# Patient Record
Sex: MALE | Race: WHITE | NOT HISPANIC OR LATINO | Employment: FULL TIME | URBAN - METROPOLITAN AREA
[De-identification: names, ages, dates, MRNs, and addresses within clinical notes are randomized per-mention and may not be internally consistent; named-entity substitution may affect disease eponyms.]

---

## 2017-01-06 ENCOUNTER — TRANSFERRED RECORDS (OUTPATIENT)
Dept: HEALTH INFORMATION MANAGEMENT | Facility: CLINIC | Age: 57
End: 2017-01-06

## 2017-01-06 LAB
ALT SERPL-CCNC: 16 IU/L (ref 10–40)
AST SERPL-CCNC: 19.2 IU/L (ref 10–42)

## 2017-06-27 ENCOUNTER — TRANSFERRED RECORDS (OUTPATIENT)
Dept: HEALTH INFORMATION MANAGEMENT | Facility: CLINIC | Age: 57
End: 2017-06-27

## 2017-06-27 LAB
ALT SERPL-CCNC: 15 IU/L (ref 10–40)
AST SERPL-CCNC: 18.2 IU/L (ref 10–42)

## 2017-12-14 ENCOUNTER — OFFICE VISIT (OUTPATIENT)
Dept: PEDIATRICS | Facility: CLINIC | Age: 57
End: 2017-12-14
Payer: COMMERCIAL

## 2017-12-14 VITALS
SYSTOLIC BLOOD PRESSURE: 120 MMHG | HEART RATE: 66 BPM | DIASTOLIC BLOOD PRESSURE: 82 MMHG | BODY MASS INDEX: 25.47 KG/M2 | OXYGEN SATURATION: 99 % | HEIGHT: 72 IN | WEIGHT: 188 LBS | TEMPERATURE: 98.2 F

## 2017-12-14 DIAGNOSIS — Z11.59 NEED FOR HEPATITIS C SCREENING TEST: ICD-10-CM

## 2017-12-14 DIAGNOSIS — Z00.00 ENCOUNTER FOR ROUTINE ADULT HEALTH EXAMINATION WITHOUT ABNORMAL FINDINGS: Primary | ICD-10-CM

## 2017-12-14 DIAGNOSIS — Z23 NEED FOR PROPHYLACTIC VACCINATION AND INOCULATION AGAINST INFLUENZA: ICD-10-CM

## 2017-12-14 LAB
ALBUMIN SERPL-MCNC: 3.7 G/DL (ref 3.4–5)
ALP SERPL-CCNC: 46 U/L (ref 40–150)
ALT SERPL W P-5'-P-CCNC: 21 U/L (ref 0–70)
ANION GAP SERPL CALCULATED.3IONS-SCNC: 6 MMOL/L (ref 3–14)
AST SERPL W P-5'-P-CCNC: 16 U/L (ref 0–45)
BILIRUB SERPL-MCNC: 0.4 MG/DL (ref 0.2–1.3)
BUN SERPL-MCNC: 17 MG/DL (ref 7–30)
CALCIUM SERPL-MCNC: 8.8 MG/DL (ref 8.5–10.1)
CHLORIDE SERPL-SCNC: 108 MMOL/L (ref 94–109)
CHOLEST SERPL-MCNC: 176 MG/DL
CO2 SERPL-SCNC: 28 MMOL/L (ref 20–32)
CREAT SERPL-MCNC: 1.04 MG/DL (ref 0.66–1.25)
GFR SERPL CREATININE-BSD FRML MDRD: 73 ML/MIN/1.7M2
GLUCOSE SERPL-MCNC: 94 MG/DL (ref 70–99)
HCV AB SERPL QL IA: NONREACTIVE
HDLC SERPL-MCNC: 50 MG/DL
LDLC SERPL CALC-MCNC: 98 MG/DL
NONHDLC SERPL-MCNC: 126 MG/DL
POTASSIUM SERPL-SCNC: 4.3 MMOL/L (ref 3.4–5.3)
PROT SERPL-MCNC: 6.7 G/DL (ref 6.8–8.8)
SODIUM SERPL-SCNC: 142 MMOL/L (ref 133–144)
TRIGL SERPL-MCNC: 141 MG/DL
TSH SERPL DL<=0.005 MIU/L-ACNC: 3.43 MU/L (ref 0.4–4)

## 2017-12-14 PROCEDURE — 84443 ASSAY THYROID STIM HORMONE: CPT | Performed by: INTERNAL MEDICINE

## 2017-12-14 PROCEDURE — 99396 PREV VISIT EST AGE 40-64: CPT | Performed by: INTERNAL MEDICINE

## 2017-12-14 PROCEDURE — 80061 LIPID PANEL: CPT | Performed by: INTERNAL MEDICINE

## 2017-12-14 PROCEDURE — 36415 COLL VENOUS BLD VENIPUNCTURE: CPT | Performed by: INTERNAL MEDICINE

## 2017-12-14 PROCEDURE — 86803 HEPATITIS C AB TEST: CPT | Performed by: INTERNAL MEDICINE

## 2017-12-14 PROCEDURE — 80053 COMPREHEN METABOLIC PANEL: CPT | Performed by: INTERNAL MEDICINE

## 2017-12-14 RX ORDER — CARNOSINE 100 %
POWDER (GRAM) MISCELLANEOUS
COMMUNITY

## 2017-12-14 NOTE — NURSING NOTE
Chief Complaint   Patient presents with     Physical       Initial /82 (BP Location: Right arm, Cuff Size: Adult Regular)  Pulse 66  Temp 98.2  F (36.8  C) (Oral)  Ht 6' (1.829 m)  Wt 188 lb (85.3 kg)  SpO2 99%  BMI 25.5 kg/m2 Estimated body mass index is 25.5 kg/(m^2) as calculated from the following:    Height as of this encounter: 6' (1.829 m).    Weight as of this encounter: 188 lb (85.3 kg).  Medication Reconciliation: complete     Becki Connell MA   December 14, 2017,  10:24 AM

## 2017-12-14 NOTE — MR AVS SNAPSHOT
"              After Visit Summary   12/14/2017    Ladarius Gaytan    MRN: [id number]           Patient Information     Date Of Birth          1960        Visit Information        Provider Department      12/14/2017 10:20 AM Stevie Hood MD [facility]        Today's Diagnoses     Encounter for routine adult health examination without abnormal findings    -  1    Need for hepatitis C screening test        Need for prophylactic vaccination and inoculation against influenza          Care Instructions        Lab work downstairs today.  Directions:  As you walk through the first floor, you'll see (on the right) first the pharmacy, then some bathrooms, then the \"Lab and Imaging\" area. Give them your name at the window there and wait for them to call you.     Consider shingles vaccine at age 60, and pneumonia shots at age 65 and 66.    Preventive Health Recommendations  Male Ages 50 - 64    Yearly exam:             See your health care provider every year in order to  o   Review health changes.   o   Discuss preventive care.    o   Review your medicines if your doctor has prescribed any.     Have a cholesterol test every 5 years, or more frequently if you are at risk for high cholesterol/heart disease.     Have a diabetes test (fasting glucose) every three years. If you are at risk for diabetes, you should have this test more often.     Have a colonoscopy at age 50, or have a yearly FIT test (stool test). These exams will check for colon cancer.      Talk with your health care provider about whether or not a prostate cancer screening test (PSA) is right for you.    You should be tested each year for STDs (sexually transmitted diseases), if you re at risk.     Shots: Get a flu shot each year. Get a tetanus shot every 10 years.     Nutrition:    Eat at least 5 servings of fruits and vegetables daily.     Eat whole-grain bread, whole-wheat pasta and brown rice instead of white grains and rice.     Talk to " your provider about Calcium and Vitamin D.     Lifestyle    Exercise for at least 150 minutes a week (30 minutes a day, 5 days a week). This will help you control your weight and prevent disease.     Limit alcohol to one drink per day.     No smoking.     Wear sunscreen to prevent skin cancer.     See your dentist every six months for an exam and cleaning.     See your eye doctor every 1 to 2 years.                                       Rotator Cuff Injury   What is a rotator cuff injury?   A rotator cuff injury is a strain or tear in the group of tendons and muscles that hold your shoulder joint together and help move your shoulder.   How does it occur?   A rotator cuff injury may result from:     using your arm to break a fall     falling onto your arm     lifting a heavy object     use of your shoulder in sports with a repetitive overhead movement, such as swimming, baseball (mainly pitchers), football, and tennis, which gradually strains the tendon     manual labor such as painting, plastering, raking leaves, or housework   What are the symptoms?   The symptoms of a torn rotator cuff are:     arm and shoulder pain     shoulder weakness     shoulder tenderness     loss of shoulder movement, especially overhead   How is it diagnosed?   Your healthcare provider will examine you and check your shoulder for pain, tenderness, and loss of motion as you move your arm in all directions. Your provider will ask if your shoulder pain began suddenly or gradually. You may have an X-ray to make sure there are not any fractures or bone spurs.   Based on these results, you may have other tests or procedures right away or later, such as:     magnetic resonance imaging (MRI), which creates images of your shoulder and surrounding structures with sound waves     an arthrogram, which is an X-ray or MRI that is taken after a special dye has been injected into your shoulder joint to outline its soft structures     arthroscopy, a  surgical procedure in which a small instrument is inserted into your shoulder joint so your provider can look directly at your rotator cuff   What is the treatment?   A tendon in your shoulder can be inflamed, partially torn, or completely torn. What is done about it depends on how torn it is and how much it hurts.   If your tear is a minor one, it can be left to heal by itself if it does not interfere with your everyday activities. Your treatment plan should include:     proper sitting posture, in which your head and shoulders are balanced     rest for your shoulder, which means avoiding strenuous activity or any overhead motion that causes pain     ice packs at least once a day, and preferably 2 or 3 times a day     doing the exercises your healthcare provider gives you     anti-inflammatory drugs. Adults aged 65 years and older should not take non-steroidal anti-inflammatory medicine for more than 7 days without their healthcare provider's approval.     physical therapy to strengthen your shoulder as it heals   If you have a bad tear, you may need to have it repaired by arthroscopy. Arthroscopy can be used to perform surgery on a joint as well as to see inside the joint. The rough edges of a torn tendon can be trimmed and left to heal. Larger tears can be stitched back together. After surgery, your treatment plan will include physical therapy to strengthen your shoulder as it heals.   How long will the effects last?   Full recovery depends on what is torn and how it is treated.   When can I return to my normal activities?   Everyone recovers from an injury at a different rate. Return to your activities will be determined by how soon your shoulder recovers, not by how many days or weeks it has been since your injury has occurred. In general, the longer you have symptoms before you start treatment, the longer it will take to get better. The goal of rehabilitation is to return you to your normal activities as soon  as is safely possible. If you return too soon you may worsen your injury.   You may safely return to your normal activities when:     Your injured shoulder has full range of motion without pain.     Your injured shoulder has regained normal strength compared to the uninjured shoulder.   What can be done to help prevent this from recurring?   The best way to prevent a recurrence is to strengthen your shoulder muscles and keep them in peak condition with shoulder exercises.           Rotator Cuff Strain Rehabilitation Exercises                  You may do all of these exercises right away.   Isometric shoulder external rotation:  a doorway with your elbow bent 90 degrees and the back of the wrist on your injured side pressed against the door frame. Try to press your hand outward into the door frame. Hold for 5 seconds. Do 3 sets of 10.   Isometric shoulder internal rotation:  a doorway with your elbow bent 90 degrees and the front of the wrist on your injured side pressed against the door frame. Try to press your palm into the door frame. Hold for 5 seconds. Do 3 sets of 10.   Wand exercise: Flexion: Stand upright and hold a stick in both hands, palms down. Stretch your arms by lifting them over your head, keeping your arms straight. Hold for 5 seconds and return to the starting position. Repeat 10 times.   Wand exercise: Extension: Stand upright and hold a stick in both hands behind your back. Move the stick away from your back. Hold the end position for 5 seconds. Relax and return to the starting position. Repeat 10 times.   Wand exercise: External rotation: Lie on your back and hold a stick in both hands, palms up. Your upper arms should be resting on the floor with your elbows at your sides and bent 90 degrees. Use your uninjured arm to push your injured arm out away from your body. Keep the elbow of your injured arm at your side while it is being pushed. Hold the stretch for 5 seconds. Repeat 10  times.   Wand exercise: Shoulder abduction and adduction: Stand and hold a stick with both hands, palms facing away from your body. Rest the stick against the front of your thighs. Use your uninjured arm to push your injured arm out to the side and up as high as possible. Keep your arms straight. Hold for 5 seconds. Repeat 10 times.   Resisted shoulder external rotation: Stand sideways next to a door with your injured arm farther from the door. Tie a knot in the end of the tubing and shut the knot in the door at waist level. Hold the other end of the tubing with the hand of your injured arm. Rest the hand of your injured arm across your stomach. Keeping your elbow in at your side, rotate your arm outward and away from your waist. Make sure you keep your elbow bent 90 degrees and your forearm parallel to the floor. Repeat 10 times. Build up to 3 sets of 10.   Resisted shoulder internal rotation: Stand sideways next to a door with your injured arm closest to the door. Tie a knot in the end of the tubing and shut the knot in the door at waist level. Hold the other end of the tubing with the hand of your injured arm. Bend the elbow of your injured arm 90 degrees. Keeping your elbow in at your side, rotate your forearm across your body and then back to the starting position. Make sure you keep your forearm parallel to the floor. Do 3 sets of 10.   Scaption: Stand with your arms at your sides and with your elbows straight. Slowly raise your arms to eye level. As you raise your arms, spread them apart so that they are only slightly in front of your body (at about a 30-degree angle to the front of your body). Point your thumbs toward the ceiling. Hold for 2 seconds and lower your arms slowly. Do 3 sets of 10. Progress to holding a soup can or light weight when you are doing the exercise and increase the weight as the exercise gets easier.   Side-lying external rotation: Lie on your uninjured side with your injured arm at  "your side and your elbow bent 90 degrees. Keeping your elbow against your side, raise your forearm toward the ceiling and hold for 2 seconds. Slowly lower your arm. Do 3 sets of 10. You can start doing this exercise holding a soup can or light weight and gradually increase the weight as long as there is no pain.   Horizontal abduction: Lie on your stomach on a table or the edge of a bed with the arm on your injured side hanging down over the edge. Raise your arm out to the side, with your thumb pointed toward the ceiling, until your arm is parallel to the floor. Hold for 2 seconds and then lower it slowly. Start this exercise with no weight. As you get stronger, add a light weight or hold a soup can. Do 3 sets of 10.   Push-up with a plus: Begin on the floor on your hands and knees. Keep your arms a shoulder width apart and lift your feet off the floor. Arch your back as high as possible and round your shoulders (this is the \"plus\" part or the exercise). Bend your elbows and lower your body to the floor. Return to the starting position and arch your back again. Do 3 sets of 10.   Published by [employer].  This content is reviewed periodically and is subject to change as new health information becomes available. The information is intended to inform and educate and is not a replacement for medical evaluation, advice, diagnosis or treatment by a healthcare professional.   Written by Indira Bar, MS, PT, and Marnie Quan PT, [facility], [facility], for [employer].   ? 2010 [facility] and/or its affiliates. All Rights Reserved.   Copyright   [employer] 2011  Adult Health Advisor                          Follow-ups after your visit        Who to contact     If you have questions or need follow up information about today's clinic visit or your schedule please contact [facility] directly at [phone].  Normal or non-critical lab and imaging results will be communicated to you by MyChart, letter or " phone within 4 business days after the clinic has received the results. If you do not hear from us within 7 days, please contact the clinic through [employer] or phone. If you have a critical or abnormal lab result, we will notify you by phone as soon as possible.  Submit refill requests through [employer] or call your pharmacy and they will forward the refill request to us. Please allow 3 business days for your refill to be completed.          Additional Information About Your Visit        [employer] Information     [employer] gives you secure access to your electronic health record. If you see a primary care provider, you can also send messages to your care team and make appointments. If you have questions, please call your primary care clinic.  If you do not have a primary care provider, please call [phone] and they will assist you.        Care EveryWhere ID     This is your Care EveryWhere ID. This could be used by other organizations to access your [place] medical records  [id number]        Your Vitals Were     Pulse Temperature Height Pulse Oximetry BMI (Body Mass Index)       66 98.2  F (36.8  C) (Oral) 6' (1.829 m) 99% 25.5 kg/m2        Blood Pressure from Last 3 Encounters:   12/14/17 120/82   01/29/16 107/66   06/11/15 110/68    Weight from Last 3 Encounters:   12/14/17 188 lb (85.3 kg)   01/29/16 196 lb (88.9 kg)   06/11/15 191 lb (86.6 kg)              We Performed the Following     Comprehensive metabolic panel     Hepatitis C Screen Reflex to HCV RNA Quant and Genotype     Lipid panel reflex to direct LDL Fasting     TSH with free T4 reflex        Primary Care Provider Office Phone # Fax #    Stevie Hood [phone] [address] [place] [address]        Equal Access to Services     [facility] : Nano Davey, rika tucker, diann jaimes. So [facility] [phone].    ATENCIÓN: Greta fernandez,  tiene a braswell disposición servicios gratuitos de asistencia lingüística. Yohan loza [phone].    We comply with applicable federal civil rights laws and [place] laws. We do not discriminate on the basis of race, color, national origin, age, disability, sex, sexual orientation, or gender identity.            Thank you!     Thank you for choosing [facility]  for your care. Our goal is always to provide you with excellent care. Hearing back from our patients is one way we can continue to improve our services. Please take a few minutes to complete the written survey that you may receive in the mail after your visit with us. Thank you!             Your Updated Medication List - Protect others around you: Learn how to safely use, store and throw away your medicines at www.disposemymeds.org.          This list is accurate as of: 12/14/17 10:48 AM.  Always use your most recent med list.                   Brand Name Dispense Instructions for use Diagnosis    CENTRUM Tabs     30 tablet    Take 1 tablet by mouth daily        clomiPHENE 50 MG tablet    CLOMID     Take 25 mg by mouth daily        L-Carnosine Powd           ZINC PO

## 2017-12-14 NOTE — PATIENT INSTRUCTIONS
"    Lab work downstairs today.  Directions:  As you walk through the first floor, you'll see (on the right) first the pharmacy, then some bathrooms, then the \"Lab and Imaging\" area. Give them your name at the window there and wait for them to call you.     Consider shingles vaccine at age 60, and pneumonia shots at age 65 and 66.    Preventive Health Recommendations  Male Ages 50 - 64    Yearly exam:             See your health care provider every year in order to  o   Review health changes.   o   Discuss preventive care.    o   Review your medicines if your doctor has prescribed any.     Have a cholesterol test every 5 years, or more frequently if you are at risk for high cholesterol/heart disease.     Have a diabetes test (fasting glucose) every three years. If you are at risk for diabetes, you should have this test more often.     Have a colonoscopy at age 50, or have a yearly FIT test (stool test). These exams will check for colon cancer.      Talk with your health care provider about whether or not a prostate cancer screening test (PSA) is right for you.    You should be tested each year for STDs (sexually transmitted diseases), if you re at risk.     Shots: Get a flu shot each year. Get a tetanus shot every 10 years.     Nutrition:    Eat at least 5 servings of fruits and vegetables daily.     Eat whole-grain bread, whole-wheat pasta and brown rice instead of white grains and rice.     Talk to your provider about Calcium and Vitamin D.     Lifestyle    Exercise for at least 150 minutes a week (30 minutes a day, 5 days a week). This will help you control your weight and prevent disease.     Limit alcohol to one drink per day.     No smoking.     Wear sunscreen to prevent skin cancer.     See your dentist every six months for an exam and cleaning.     See your eye doctor every 1 to 2 years.                                       Rotator Cuff Injury   What is a rotator cuff injury?   A rotator cuff injury is a " strain or tear in the group of tendons and muscles that hold your shoulder joint together and help move your shoulder.   How does it occur?   A rotator cuff injury may result from:     using your arm to break a fall     falling onto your arm     lifting a heavy object     use of your shoulder in sports with a repetitive overhead movement, such as swimming, baseball (mainly pitchers), football, and tennis, which gradually strains the tendon     manual labor such as painting, plastering, raking leaves, or housework   What are the symptoms?   The symptoms of a torn rotator cuff are:     arm and shoulder pain     shoulder weakness     shoulder tenderness     loss of shoulder movement, especially overhead   How is it diagnosed?   Your healthcare provider will examine you and check your shoulder for pain, tenderness, and loss of motion as you move your arm in all directions. Your provider will ask if your shoulder pain began suddenly or gradually. You may have an X-ray to make sure there are not any fractures or bone spurs.   Based on these results, you may have other tests or procedures right away or later, such as:     magnetic resonance imaging (MRI), which creates images of your shoulder and surrounding structures with sound waves     an arthrogram, which is an X-ray or MRI that is taken after a special dye has been injected into your shoulder joint to outline its soft structures     arthroscopy, a surgical procedure in which a small instrument is inserted into your shoulder joint so your provider can look directly at your rotator cuff   What is the treatment?   A tendon in your shoulder can be inflamed, partially torn, or completely torn. What is done about it depends on how torn it is and how much it hurts.   If your tear is a minor one, it can be left to heal by itself if it does not interfere with your everyday activities. Your treatment plan should include:     proper sitting posture, in which your head and  shoulders are balanced     rest for your shoulder, which means avoiding strenuous activity or any overhead motion that causes pain     ice packs at least once a day, and preferably 2 or 3 times a day     doing the exercises your healthcare provider gives you     anti-inflammatory drugs. Adults aged 65 years and older should not take non-steroidal anti-inflammatory medicine for more than 7 days without their healthcare provider's approval.     physical therapy to strengthen your shoulder as it heals   If you have a bad tear, you may need to have it repaired by arthroscopy. Arthroscopy can be used to perform surgery on a joint as well as to see inside the joint. The rough edges of a torn tendon can be trimmed and left to heal. Larger tears can be stitched back together. After surgery, your treatment plan will include physical therapy to strengthen your shoulder as it heals.   How long will the effects last?   Full recovery depends on what is torn and how it is treated.   When can I return to my normal activities?   Everyone recovers from an injury at a different rate. Return to your activities will be determined by how soon your shoulder recovers, not by how many days or weeks it has been since your injury has occurred. In general, the longer you have symptoms before you start treatment, the longer it will take to get better. The goal of rehabilitation is to return you to your normal activities as soon as is safely possible. If you return too soon you may worsen your injury.   You may safely return to your normal activities when:     Your injured shoulder has full range of motion without pain.     Your injured shoulder has regained normal strength compared to the uninjured shoulder.   What can be done to help prevent this from recurring?   The best way to prevent a recurrence is to strengthen your shoulder muscles and keep them in peak condition with shoulder exercises.           Rotator Cuff Strain Rehabilitation  Exercises                  You may do all of these exercises right away.   Isometric shoulder external rotation:  a doorway with your elbow bent 90 degrees and the back of the wrist on your injured side pressed against the door frame. Try to press your hand outward into the door frame. Hold for 5 seconds. Do 3 sets of 10.   Isometric shoulder internal rotation:  a doorway with your elbow bent 90 degrees and the front of the wrist on your injured side pressed against the door frame. Try to press your palm into the door frame. Hold for 5 seconds. Do 3 sets of 10.   Wand exercise: Flexion: Stand upright and hold a stick in both hands, palms down. Stretch your arms by lifting them over your head, keeping your arms straight. Hold for 5 seconds and return to the starting position. Repeat 10 times.   Wand exercise: Extension: Stand upright and hold a stick in both hands behind your back. Move the stick away from your back. Hold the end position for 5 seconds. Relax and return to the starting position. Repeat 10 times.   Wand exercise: External rotation: Lie on your back and hold a stick in both hands, palms up. Your upper arms should be resting on the floor with your elbows at your sides and bent 90 degrees. Use your uninjured arm to push your injured arm out away from your body. Keep the elbow of your injured arm at your side while it is being pushed. Hold the stretch for 5 seconds. Repeat 10 times.   Wand exercise: Shoulder abduction and adduction: Stand and hold a stick with both hands, palms facing away from your body. Rest the stick against the front of your thighs. Use your uninjured arm to push your injured arm out to the side and up as high as possible. Keep your arms straight. Hold for 5 seconds. Repeat 10 times.   Resisted shoulder external rotation: Stand sideways next to a door with your injured arm farther from the door. Tie a knot in the end of the tubing and shut the knot in the door at waist  level. Hold the other end of the tubing with the hand of your injured arm. Rest the hand of your injured arm across your stomach. Keeping your elbow in at your side, rotate your arm outward and away from your waist. Make sure you keep your elbow bent 90 degrees and your forearm parallel to the floor. Repeat 10 times. Build up to 3 sets of 10.   Resisted shoulder internal rotation: Stand sideways next to a door with your injured arm closest to the door. Tie a knot in the end of the tubing and shut the knot in the door at waist level. Hold the other end of the tubing with the hand of your injured arm. Bend the elbow of your injured arm 90 degrees. Keeping your elbow in at your side, rotate your forearm across your body and then back to the starting position. Make sure you keep your forearm parallel to the floor. Do 3 sets of 10.   Scaption: Stand with your arms at your sides and with your elbows straight. Slowly raise your arms to eye level. As you raise your arms, spread them apart so that they are only slightly in front of your body (at about a 30-degree angle to the front of your body). Point your thumbs toward the ceiling. Hold for 2 seconds and lower your arms slowly. Do 3 sets of 10. Progress to holding a soup can or light weight when you are doing the exercise and increase the weight as the exercise gets easier.   Side-lying external rotation: Lie on your uninjured side with your injured arm at your side and your elbow bent 90 degrees. Keeping your elbow against your side, raise your forearm toward the ceiling and hold for 2 seconds. Slowly lower your arm. Do 3 sets of 10. You can start doing this exercise holding a soup can or light weight and gradually increase the weight as long as there is no pain.   Horizontal abduction: Lie on your stomach on a table or the edge of a bed with the arm on your injured side hanging down over the edge. Raise your arm out to the side, with your thumb pointed toward the  "ceiling, until your arm is parallel to the floor. Hold for 2 seconds and then lower it slowly. Start this exercise with no weight. As you get stronger, add a light weight or hold a soup can. Do 3 sets of 10.   Push-up with a plus: Begin on the floor on your hands and knees. Keep your arms a shoulder width apart and lift your feet off the floor. Arch your back as high as possible and round your shoulders (this is the \"plus\" part or the exercise). Bend your elbows and lower your body to the floor. Return to the starting position and arch your back again. Do 3 sets of 10.   Published by [employer].  This content is reviewed periodically and is subject to change as new health information becomes available. The information is intended to inform and educate and is not a replacement for medical evaluation, advice, diagnosis or treatment by a healthcare professional.   Written by Indira Bar, MS, PT, and Marnie Quan PT, [facility], [facility], for [employer].   ? 2010 [facility] and/or its affiliates. All Rights Reserved.   Copyright   [employer] 2011  Adult Health Advisor                  "

## 2017-12-14 NOTE — PROGRESS NOTES
"SUBJECTIVE:   CC: Ladarius Gaytan is an 57 year old male who presents for preventative health visit.     Physical   Annual:     Getting at least 3 servings of Calcium per day::  Yes    Bi-annual eye exam::  Yes    Dental care twice a year::  NO    Sleep apnea or symptoms of sleep apnea::  None    Diet::  Regular (no restrictions)    Frequency of exercise::  4-5 days/week    Duration of exercise::  30-45 minutes    Taking medications regularly::  Yes    Medication side effects::  Other    Additional concerns today::  YES              Pt has a few topics to discuss today.     Seeing urology for his clomid and testosterone.     Worried about his vitelligo spreading.    Depression.  Mild symptoms.  Wondering about \"low dose naltrexone.\"    SOme lesions on his shoulders.        Today's PHQ-2 Score:   PHQ-2 ( 1999 [employer]) 12/14/2017   Q1: Little interest or pleasure in doing things 1   Q2: Feeling down, depressed or hopeless 1   PHQ-2 Score 2   Q1: Little interest or pleasure in doing things Several days   Q2: Feeling down, depressed or hopeless Several days   PHQ-2 Score 2       Abuse: Current or Past (Physical, Sexual or Emotional)- No  Do you feel safe in your environment - Yes    Social History   Substance Use Topics     Smoking status: Former Smoker     Packs/day: 1.00     Years: 25.00     Types: Cigarettes     Quit date: 10/9/2010     Smokeless tobacco: Never Used     Alcohol use Yes      Comment: 3-6 beers, three times weekly.      Alcohol Use 12/14/2017   If you drink alcohol, do you typically have greater than 3 drinks per day OR greater than 7 drinks per week?   Yes   AUDIT SCORE  5     AUDIT - Alcohol Use Disorders Identification Test - Reproduced from the World Health Organization Audit 2001 (Second Edition) 12/14/2017   1.  How often do you have a drink containing alcohol? 2 to 3 times a week   2.  How many drinks containing alcohol do you have on a typical day when you are drinking? 3 or 4   3.  How often do " you have five or more drinks on one occasion? Less than monthly   4.  How often during the last year have you found that you were not able to stop drinking once you had started? Never   5.  How often during the last year have you failed to do what was normally expected of you because of drinking? Never   6.  How often during the last year have you needed a first drink in the morning to get yourself going after a heavy drinking session? Never   7.  How often during the last year have you had a feeling of guilt or remorse after drinking? Never   8.  How often during the last year have you been unable to remember what happened the night before because of your drinking? Never   9.  Have you or someone else been injured because of your drinking? No   10. Has a relative, friend, doctor or other health care worker been concerned about your drinking or suggested you cut down? No   TOTAL SCORE 5         Last PSA: No results found for: PSA    Reviewed orders with patient. Reviewed health maintenance and updated orders accordingly - Yes  Labs reviewed in EPIC  BP Readings from Last 3 Encounters:   12/14/17 120/82   01/29/16 107/66   06/11/15 110/68    Wt Readings from Last 3 Encounters:   12/14/17 188 lb (85.3 kg)   01/29/16 196 lb (88.9 kg)   06/11/15 191 lb (86.6 kg)                  Patient Active Problem List   Diagnosis     Lumbar radiculopathy     Renal cyst     Vitamin D deficiency     Alcohol abuse     Impaired fasting glucose     Erectile dysfunction     Past Surgical History:   Procedure Laterality Date     COLONOSCOPY  11/18/2011    Procedure:COLONOSCOPY; COLONOSCOPY; Surgeon:JESE FRANKLIN; Location: GI     DISCECTOMY LUMBAR MINIMALLY INVASIVE ONE LEVEL  2011    right L4-5 microdiscectomy     EXCISE MASS LOWER EXTREMITY  2/12/2014    Procedure: EXCISE MASS LOWER EXTREMITY;  Right Knee/tibial tubercle Mass Excision ;  Surgeon: Clifford Aguilera MD;  Location:  OR       Social History   Substance Use Topics      Smoking status: Former Smoker     Packs/day: 1.00     Years: 25.00     Types: Cigarettes     Quit date: 10/9/2010     Smokeless tobacco: Never Used     Alcohol use Yes      Comment: 3-6 beers, three times weekly.      Family History   Problem Relation Age of Onset     Unknown/Adopted Mother      Unknown/Adopted Father          Current Outpatient Prescriptions   Medication Sig Dispense Refill     Multiple Vitamins-Minerals (ZINC PO)        L-Carnosine POWD        clomiPHENE (CLOMID) 50 MG tablet Take 25 mg by mouth daily       Multiple Vitamins-Minerals (CENTRUM) TABS Take 1 tablet by mouth daily 30 tablet      Allergies   Allergen Reactions     Ibuprofen [Aspartame-Ibuprofen] Hives and Swelling     Penicillins Hives and Swelling     No problems with Cephalexin     Aspirin      Eyes swelled     Gadolinium Hives           Reviewed and updated as needed this visit by clinical staff  Tobacco  Allergies  Meds  Problems  Med Hx  Surg Hx  Fam Hx  Soc Hx          Reviewed and updated as needed this visit by Provider  Allergies  Meds  Problems          History reviewed. No pertinent past medical history.   Past Surgical History:   Procedure Laterality Date     COLONOSCOPY  11/18/2011    Procedure:COLONOSCOPY; COLONOSCOPY; Surgeon:JESE FRANKLIN; Location: GI     DISCECTOMY LUMBAR MINIMALLY INVASIVE ONE LEVEL  2011    right L4-5 microdiscectomy     EXCISE MASS LOWER EXTREMITY  2/12/2014    Procedure: EXCISE MASS LOWER EXTREMITY;  Right Knee/tibial tubercle Mass Excision ;  Surgeon: Clifford Aguilera MD;  Location:  OR     Review of Systems  C: NEGATIVE for fever, chills, change in weight  I: NEGATIVE for worrisome rashes, moles or lesions  E: NEGATIVE for vision changes or irritation  ENT: NEGATIVE for ear, mouth and throat problems  R: NEGATIVE for significant cough or SOB  CV: NEGATIVE for chest pain, palpitations or peripheral edema  GI: NEGATIVE for nausea, abdominal pain, heartburn, or change in  bowel habits   male: negative for dysuria, hematuria, decreased urinary stream, erectile dysfunction, urethral discharge  M: NEGATIVE for significant arthralgias or myalgia  N: NEGATIVE for weakness, dizziness or paresthesias  P: NEGATIVE for changes in mood or affect    OBJECTIVE:   /82 (BP Location: Right arm, Cuff Size: Adult Regular)  Pulse 66  Temp 98.2  F (36.8  C) (Oral)  Ht 6' (1.829 m)  Wt 188 lb (85.3 kg)  SpO2 99%  BMI 25.5 kg/m2    Physical Exam  GENERAL: healthy, alert and no distress  EYES: Eyes grossly normal to inspection, PERRL and conjunctivae and sclerae normal  HENT: ear canals and TM's normal, nose and mouth without ulcers or lesions  NECK: no adenopathy, no asymmetry, masses, or scars and thyroid normal to palpation  RESP: lungs clear to auscultation - no rales, rhonchi or wheezes  CV: regular rate and rhythm, normal S1 S2, no S3 or S4, no murmur, click or rub, no peripheral edema and peripheral pulses strong  ABDOMEN: soft, nontender, no hepatosplenomegaly, no masses and bowel sounds normal   (male): normal male genitalia without lesions or urethral discharge, no hernia  MS: no gross musculoskeletal defects noted, no edema  SKIN: no suspicious lesions or rashes  NEURO: Normal strength and tone, mentation intact and speech normal  PSYCH: mentation appears normal, affect normal/bright    ASSESSMENT/PLAN:   1. Need for hepatitis C screening test      2. Need for prophylactic vaccination and inoculation against influenza      3. Encounter for routine adult health examination without abnormal findings  Discussed diet, exercise, testicular self exam, blood pressure, cholesterol, and need for cancer surveillance at appropriate ages.   - Hepatitis C Screen Reflex to HCV RNA Quant and Genotype  - TSH with free T4 reflex  - Comprehensive metabolic panel  - Lipid panel reflex to direct LDL Fasting    4.  Shoulder pain: advised beginning exercises.    5.  Concerns about vitiligo;  Patient  "wanting prescription for \"low dose naltrexone\", which I refused.  Not sure what we would be treating.  He may discuss with rheumatologist, as he was refereed to them from his urology for vague concerns of inlammation, depression, thyroid concerns.  Screen with thyroid stimulating hormone (TSH) today .    COUNSELING:   Reviewed preventive health counseling, as reflected in patient instructions       Regular exercise       Healthy diet/nutrition       Vision screening       Hearing screening       Family planning       Safe sex practices/STD prevention       Colon cancer screening       Prostate cancer screening           reports that he quit smoking about 7 years ago. His smoking use included Cigarettes. He has a 25.00 pack-year smoking history. He has never used smokeless tobacco.      Estimated body mass index is 25.5 kg/(m^2) as calculated from the following:    Height as of this encounter: 6' (1.829 m).    Weight as of this encounter: 188 lb (85.3 kg).         Counseling Resources:  ATP IV Guidelines  Pooled Cohorts Equation Calculator  FRAX Risk Assessment  ICSI Preventive Guidelines  Dietary Guidelines for Americans, 2010  USDA's MyPlate  ASA Prophylaxis  Lung CA Screening    Stevie Hood MD  [facility]  "

## 2018-02-15 ENCOUNTER — TRANSFERRED RECORDS (OUTPATIENT)
Dept: HEALTH INFORMATION MANAGEMENT | Facility: CLINIC | Age: 58
End: 2018-02-15

## 2018-02-15 LAB
ALT SERPL-CCNC: 13 IU/L (ref 10–40)
AST SERPL-CCNC: 15.3 IU/L (ref 10–42)

## 2018-07-25 ENCOUNTER — TRANSFERRED RECORDS (OUTPATIENT)
Dept: HEALTH INFORMATION MANAGEMENT | Facility: CLINIC | Age: 58
End: 2018-07-25

## 2018-07-25 LAB
ALT SERPL-CCNC: 15 IU/L (ref 10–40)
AST SERPL-CCNC: 16.3 IU/L (ref 10–42)

## 2018-08-01 ENCOUNTER — TRANSFERRED RECORDS (OUTPATIENT)
Dept: HEALTH INFORMATION MANAGEMENT | Facility: CLINIC | Age: 58
End: 2018-08-01

## 2019-02-13 ENCOUNTER — TRANSFERRED RECORDS (OUTPATIENT)
Dept: HEALTH INFORMATION MANAGEMENT | Facility: CLINIC | Age: 59
End: 2019-02-13

## 2019-04-08 ENCOUNTER — MYC MEDICAL ADVICE (OUTPATIENT)
Dept: PEDIATRICS | Facility: CLINIC | Age: 59
End: 2019-04-08

## 2019-04-08 ENCOUNTER — OFFICE VISIT (OUTPATIENT)
Dept: PEDIATRICS | Facility: CLINIC | Age: 59
End: 2019-04-08
Payer: COMMERCIAL

## 2019-04-08 VITALS
HEIGHT: 72 IN | WEIGHT: 186.5 LBS | HEART RATE: 71 BPM | SYSTOLIC BLOOD PRESSURE: 106 MMHG | OXYGEN SATURATION: 97 % | TEMPERATURE: 97.8 F | BODY MASS INDEX: 25.26 KG/M2 | DIASTOLIC BLOOD PRESSURE: 80 MMHG

## 2019-04-08 DIAGNOSIS — Z23 NEED FOR PROPHYLACTIC VACCINATION AND INOCULATION AGAINST INFLUENZA: ICD-10-CM

## 2019-04-08 DIAGNOSIS — K92.1 BLOOD IN STOOL: Primary | ICD-10-CM

## 2019-04-08 DIAGNOSIS — Z11.4 SCREENING FOR HIV (HUMAN IMMUNODEFICIENCY VIRUS): ICD-10-CM

## 2019-04-08 DIAGNOSIS — Z13.220 SCREENING CHOLESTEROL LEVEL: ICD-10-CM

## 2019-04-08 LAB
ALBUMIN SERPL-MCNC: 4.1 G/DL (ref 3.4–5)
ALP SERPL-CCNC: 44 U/L (ref 40–150)
ALT SERPL W P-5'-P-CCNC: 21 U/L (ref 0–70)
ANION GAP SERPL CALCULATED.3IONS-SCNC: 7 MMOL/L (ref 3–14)
AST SERPL W P-5'-P-CCNC: 17 U/L (ref 0–45)
BASOPHILS # BLD AUTO: 0 10E9/L (ref 0–0.2)
BASOPHILS NFR BLD AUTO: 0.4 %
BILIRUB SERPL-MCNC: 0.4 MG/DL (ref 0.2–1.3)
BUN SERPL-MCNC: 13 MG/DL (ref 7–30)
CALCIUM SERPL-MCNC: 9.6 MG/DL (ref 8.5–10.1)
CHLORIDE SERPL-SCNC: 108 MMOL/L (ref 94–109)
CHOLEST SERPL-MCNC: 179 MG/DL
CO2 SERPL-SCNC: 27 MMOL/L (ref 20–32)
CREAT SERPL-MCNC: 1.26 MG/DL (ref 0.66–1.25)
DIFFERENTIAL METHOD BLD: NORMAL
EOSINOPHIL # BLD AUTO: 0.4 10E9/L (ref 0–0.7)
EOSINOPHIL NFR BLD AUTO: 5.6 %
ERYTHROCYTE [DISTWIDTH] IN BLOOD BY AUTOMATED COUNT: 12.4 % (ref 10–15)
GFR SERPL CREATININE-BSD FRML MDRD: 62 ML/MIN/{1.73_M2}
GLUCOSE SERPL-MCNC: 103 MG/DL (ref 70–99)
HCT VFR BLD AUTO: 47.3 % (ref 40–53)
HDLC SERPL-MCNC: 47 MG/DL
HGB BLD-MCNC: 16 G/DL (ref 13.3–17.7)
HIV 1+2 AB+HIV1 P24 AG SERPL QL IA: NONREACTIVE
LDLC SERPL CALC-MCNC: 107 MG/DL
LYMPHOCYTES # BLD AUTO: 1.9 10E9/L (ref 0.8–5.3)
LYMPHOCYTES NFR BLD AUTO: 27.4 %
MCH RBC QN AUTO: 32.5 PG (ref 26.5–33)
MCHC RBC AUTO-ENTMCNC: 33.8 G/DL (ref 31.5–36.5)
MCV RBC AUTO: 96 FL (ref 78–100)
MONOCYTES # BLD AUTO: 0.7 10E9/L (ref 0–1.3)
MONOCYTES NFR BLD AUTO: 10.5 %
NEUTROPHILS # BLD AUTO: 3.9 10E9/L (ref 1.6–8.3)
NEUTROPHILS NFR BLD AUTO: 56.1 %
NONHDLC SERPL-MCNC: 132 MG/DL
PLATELET # BLD AUTO: 174 10E9/L (ref 150–450)
POTASSIUM SERPL-SCNC: 4.4 MMOL/L (ref 3.4–5.3)
PROT SERPL-MCNC: 7.1 G/DL (ref 6.8–8.8)
RBC # BLD AUTO: 4.92 10E12/L (ref 4.4–5.9)
SODIUM SERPL-SCNC: 142 MMOL/L (ref 133–144)
TRIGL SERPL-MCNC: 124 MG/DL
WBC # BLD AUTO: 7 10E9/L (ref 4–11)

## 2019-04-08 PROCEDURE — 36415 COLL VENOUS BLD VENIPUNCTURE: CPT | Performed by: INTERNAL MEDICINE

## 2019-04-08 PROCEDURE — 80061 LIPID PANEL: CPT | Performed by: INTERNAL MEDICINE

## 2019-04-08 PROCEDURE — 82274 ASSAY TEST FOR BLOOD FECAL: CPT | Performed by: INTERNAL MEDICINE

## 2019-04-08 PROCEDURE — 85025 COMPLETE CBC W/AUTO DIFF WBC: CPT | Performed by: INTERNAL MEDICINE

## 2019-04-08 PROCEDURE — 87389 HIV-1 AG W/HIV-1&-2 AB AG IA: CPT | Performed by: INTERNAL MEDICINE

## 2019-04-08 PROCEDURE — 99214 OFFICE O/P EST MOD 30 MIN: CPT | Performed by: INTERNAL MEDICINE

## 2019-04-08 PROCEDURE — 80053 COMPREHEN METABOLIC PANEL: CPT | Performed by: INTERNAL MEDICINE

## 2019-04-08 ASSESSMENT — MIFFLIN-ST. JEOR: SCORE: 1703.96

## 2019-04-08 NOTE — PATIENT INSTRUCTIONS
"Lab work downstairs today.  Directions:  As you walk through the first floor, you'll see (on the right) first the pharmacy, then some bathrooms, then the \"Lab and Imaging\" area. Give them your name at the window there and wait for them to call you.     Return your FIT test (stool test for hidden blood) and your H Pylori stool test.    Limit alcohol as much as possible.    Consider starting either zantac 150 twice daily for 2 mos, or prilosec (omeprazole) 20 mg daily for 2 months.    If symptoms persist or blood is found, we should consider upper and lower scope.     Stevie Hood MD  Internal Medicine and Pediatrics     "

## 2019-04-08 NOTE — PROGRESS NOTES
SUBJECTIVE:                                                    Ladarius Gaytan is a 58 year old male who presents to clinic today for the following health issues:      Gastrointestinal symptoms      Duration: Couple months     Description:           Bloating, gas, pain in lower abdomin mild discomfort and heavyness, darker BM, Blood after BM     Intensity:  mild, moderate    Accompanying signs and symptoms:  diarrhea, loose stools, constipation, urinary symptoms - urgency and bloating    History  Previous {similar problem: no   Previous evaluation:  none    Aggravating factors: none    Alleviating factors: nothing    Other Therapies tried: None      Had some gas and distress in lower abodmen about 1 month ago; had some dark stools, but not black.      Called here last week and noted some blood in stool; when wiping mainly.    Lots of gas as well.      No nv, no unexplained weight loss.      No nonsteroidals (like ibuprofen or aspirin or naproxen).  Drinks about 10 per week.  Not a lot of acidic or spicy foods.      Has history of colonoscopy in 2011 which was within normal limits.      Problem list and histories reviewed & adjusted, as indicated.  Additional history: as documented    Patient Active Problem List   Diagnosis     Lumbar radiculopathy     Renal cyst     Vitamin D deficiency     Alcohol abuse     Impaired fasting glucose     Erectile dysfunction     Past Surgical History:   Procedure Laterality Date     COLONOSCOPY  11/18/2011    Procedure:COLONOSCOPY; COLONOSCOPY; Surgeon:JESE FRANKLIN; Location: GI     DISCECTOMY LUMBAR MINIMALLY INVASIVE ONE LEVEL  2011    right L4-5 microdiscectomy     EXCISE MASS LOWER EXTREMITY  2/12/2014    Procedure: EXCISE MASS LOWER EXTREMITY;  Right Knee/tibial tubercle Mass Excision ;  Surgeon: Clifford Aguilera MD;  Location:  OR       Social History     Tobacco Use     Smoking status: Former Smoker     Packs/day: 1.00     Years: 25.00     Pack years: 25.00      Types: Cigarettes     Last attempt to quit: 10/9/2010     Years since quittin.5     Smokeless tobacco: Never Used   Substance Use Topics     Alcohol use: Yes     Comment: 3-6 beers, three times weekly.      Family History   Problem Relation Age of Onset     Unknown/Adopted Mother      Unknown/Adopted Father          Current Outpatient Medications   Medication Sig Dispense Refill     clomiPHENE (CLOMID) 50 MG tablet Take 25 mg by mouth daily       L-Carnosine POWD        Multiple Vitamins-Minerals (CENTRUM) TABS Take 1 tablet by mouth daily 30 tablet      Multiple Vitamins-Minerals (ZINC PO)        Allergies   Allergen Reactions     Ibuprofen [Aspartame-Ibuprofen] Hives and Swelling     Penicillins Hives and Swelling     No problems with Cephalexin     Aspirin      Eyes swelled     Gadolinium Hives     BP Readings from Last 3 Encounters:   19 106/80   17 120/82   16 107/66    Wt Readings from Last 3 Encounters:   19 84.6 kg (186 lb 8 oz)   17 85.3 kg (188 lb)   16 88.9 kg (196 lb)                  Labs reviewed in EPIC    ROS:  CONSTITUTIONAL: NEGATIVE for fever, chills, change in weight  ENT/MOUTH: NEGATIVE for ear, mouth and throat problems  RESP: NEGATIVE for significant cough or SOB  CV: NEGATIVE for chest pain, palpitations or peripheral edema    OBJECTIVE:                                                    /80 (BP Location: Right arm, Patient Position: Sitting, Cuff Size: Adult Large)   Pulse 71   Temp 97.8  F (36.6  C) (Oral)   Ht 1.829 m (6')   Wt 84.6 kg (186 lb 8 oz)   SpO2 97%   BMI 25.29 kg/m    Body mass index is 25.29 kg/m .   GENERAL: healthy, alert, well nourished, well hydrated, no distress  HENT: ear canals- normal; TMs- normal; Nose- normal; Mouth- no ulcers, no lesions  NECK: no tenderness, no adenopathy, no asymmetry, no masses, no stiffness; thyroid- normal to palpation  RESP: lungs clear to auscultation - no rales, no rhonchi, no wheezes  CV:  "regular rates and rhythm, normal S1 S2, no S3 or S4 and no murmur, no click or rub -  ABDOMEN: soft, no tenderness, no  hepatosplenomegaly, no masses, normal bowel sounds    Diagnostic test results:  Diagnostic Test Results:  none      ASSESSMENT/PLAN:                                                      3. Screening cholesterol level  Patient would like labs while he is here.   - Comprehensive metabolic panel  - Lipid panel reflex to direct LDL Fasting    4. Blood in stool  Main concern is about upper GIB, given his significant alcohol history.  He is not willing to \"jump to scopes\" right now, so we discussed screening for blood and HPylori, limiting all alcohol, and beginning a proton pump inhibitor for 2 months to promote healing, should he have an undx'd ulcer.  He will call us if symptoms recur or worseng, and we will call him should he have any abnormalities to his tests.   Patient Instructions   Lab work downstairs today.  Directions:  As you walk through the first floor, you'll see (on the right) first the pharmacy, then some bathrooms, then the \"Lab and Imaging\" area. Give them your name at the window there and wait for them to call you.     Return your FIT test (stool test for hidden blood) and your H Pylori stool test.    Limit alcohol as much as possible.    Consider starting either zantac 150 twice daily for 2 mos, or prilosec (omeprazole) 20 mg daily for 2 months.    If symptoms persist or blood is found, we should consider upper and lower scope.     Stevie Hood MD  Internal Medicine and Pediatrics        - CBC with platelets and differential  - H Pylori antigen, stool; Future  - Fecal colorectal cancer screen (FIT); Future      See Patient Instructions    Stevie Hood MD  [facility] [place]      "

## 2019-04-08 NOTE — PROGRESS NOTES
"  SUBJECTIVE:                                                    Ladarius Gaytan is a 58 year old male who presents to clinic today for the following health issues:      Stoma issues   How long have you had the stoma: ***  What problems are you having: ***  How long have you been having the problems: ***      {additional problems for provider to add:018737}    Problem list and histories reviewed & adjusted, as indicated.  Additional history: {NONE - AS DOCUMENTED:095449::\"as documented\"}    {HIST REVIEW/ LINKS 2:343648}    {PROVIDER CHARTING PREFERENCE:094924}      "

## 2019-04-09 NOTE — TELEPHONE ENCOUNTER
Patient received result note with lab results from PCP earlier today.    Jo-Ann Atkins RN  Message handled by Nurse Triage.

## 2019-04-10 DIAGNOSIS — K92.1 BLOOD IN STOOL: ICD-10-CM

## 2019-04-10 PROCEDURE — 87338 HPYLORI STOOL AG IA: CPT | Performed by: INTERNAL MEDICINE

## 2019-04-11 DIAGNOSIS — K92.1 BLOOD IN STOOL: ICD-10-CM

## 2019-04-11 LAB
H PYLORI AG STL QL IA: NORMAL
HEMOCCULT STL QL IA: NEGATIVE
SPECIMEN SOURCE: NORMAL

## 2019-05-01 ENCOUNTER — TELEPHONE (OUTPATIENT)
Dept: PEDIATRICS | Facility: CLINIC | Age: 59
End: 2019-05-01

## 2019-05-01 NOTE — TELEPHONE ENCOUNTER
Reason for Call:  Other     Detailed comments: pt wants a MMR shot and his insurance will not pay for it. Insurance told pharmacy that a PA is required for them to pay for it. Pharmacy called the insurance company.     Phone Number Patient can be reached at: Home number on file [phone] (home)    Pharmacy number [phone]  [facility]    Best Time: any    Can we leave a detailed message on this number? YES    Call taken on 5/1/2019 at 2:18 PM by Miriam Severino

## 2019-05-02 ENCOUNTER — ALLIED HEALTH/NURSE VISIT (OUTPATIENT)
Dept: NURSING | Facility: CLINIC | Age: 59
End: 2019-05-02
Payer: COMMERCIAL

## 2019-05-02 DIAGNOSIS — Z23 NEED FOR VACCINATION: Primary | ICD-10-CM

## 2019-05-02 PROCEDURE — 90471 IMMUNIZATION ADMIN: CPT

## 2019-05-02 PROCEDURE — 99207 ZZC NO CHARGE NURSE ONLY: CPT

## 2019-05-02 PROCEDURE — 90707 MMR VACCINE SC: CPT

## 2019-05-02 NOTE — PROGRESS NOTES
Screening Questionnaire for Adult Immunization    Are you sick today?   No   Do you have allergies to medications, food, a vaccine component or latex?   Yes   Have you ever had a serious reaction after receiving a vaccination?   No   Do you have a long-term health problem with heart disease, lung disease, asthma, kidney disease, metabolic disease (e.g. diabetes), anemia, or other blood disorder?   No   Do you have cancer, leukemia, HIV/AIDS, or any other immune system problem?   No   In the past 3 months, have you taken medications that affect  your immune system, such as prednisone, other steroids, or anticancer drugs; drugs for the treatment of rheumatoid arthritis, Crohn s disease, or psoriasis; or have you had radiation treatments?   No   Have you had a seizure, or a brain or other nervous system problem?   No   During the past year, have you received a transfusion of blood or blood     products, or been given immune (gamma) globulin or antiviral drug?   No   For women: Are you pregnant or is there a chance you could become        pregnant during the next month?   No   Have you received any vaccinations in the past 4 weeks?   No     Immunization questionnaire was positive for at least one answer.  Notified Dr. Hood.        Per orders of Dr. Hood, injection of MMR given by Jacqueline Soto. Patient instructed to remain in clinic for 15 minutes afterwards, and to report any adverse reaction to me immediately.       Screening performed by Jacqueline Soto on 5/2/2019 at 1:34 PM.

## 2019-05-02 NOTE — TELEPHONE ENCOUNTER
Called and spoke with representative for patient's insurance who verified that no PA is needed and it is 100% covered by insurance with no restrictions (clinic v.s pharmacy).     I called and left message for patient on his cell letting him know it would be covered. Advised him to schedule a nurse only to get vaccine. Patient can call to schedule that.    Closing encounter.    Gemma Parr (Aly) Medical Assistant 11:32 AM 5/2/2019

## 2019-06-26 ENCOUNTER — TELEPHONE (OUTPATIENT)
Dept: PEDIATRICS | Facility: CLINIC | Age: 59
End: 2019-06-26

## 2019-06-26 NOTE — TELEPHONE ENCOUNTER
"Per plan at the appointment on 4/8: \"Return your FIT test (stool test for hidden blood) and your H Pylori stool test.  Limit alcohol as much as possible.  Consider starting either zantac 150 twice daily for 2 mos, or prilosec (omeprazole) 20 mg daily for 2 months. If symptoms persist, or blood is found, we should consider upper and lower scope.\"    Call to patient:   Pain is in the mid-back and higher than before-no pain in the stomach-thinks that he has pain in the right kidney as he has this often is it feels similar, but worse-feeling tired more than usual-states that he has started feeling worse beginning of June.  Normal BMs, no blood in stool, no problems with bowels per patient.     No reflux issues, has never started PPI, or zantac.  Patient states that he felt that he did not need to as he had no heartburn issues.    This sounds like a separate issue or a kidney issue. Patient will call and schedule appointment.  Patient will call back and make an appointment-he was not at home right now.    Jo-Ann Atkins RN  Message handled by Nurse Triage.           "

## 2019-06-28 ENCOUNTER — OFFICE VISIT (OUTPATIENT)
Dept: PEDIATRICS | Facility: CLINIC | Age: 59
End: 2019-06-28
Payer: COMMERCIAL

## 2019-06-28 VITALS
SYSTOLIC BLOOD PRESSURE: 124 MMHG | TEMPERATURE: 97.4 F | DIASTOLIC BLOOD PRESSURE: 72 MMHG | HEART RATE: 72 BPM | HEIGHT: 72 IN | BODY MASS INDEX: 24.38 KG/M2 | OXYGEN SATURATION: 97 % | WEIGHT: 180 LBS

## 2019-06-28 DIAGNOSIS — G89.29 CHRONIC BILATERAL LOW BACK PAIN WITHOUT SCIATICA: ICD-10-CM

## 2019-06-28 DIAGNOSIS — R63.4 LOSS OF WEIGHT: ICD-10-CM

## 2019-06-28 DIAGNOSIS — R10.31 ABDOMINAL PAIN, RIGHT LOWER QUADRANT: Primary | ICD-10-CM

## 2019-06-28 DIAGNOSIS — R53.83 FATIGUE, UNSPECIFIED TYPE: ICD-10-CM

## 2019-06-28 DIAGNOSIS — M54.50 CHRONIC BILATERAL LOW BACK PAIN WITHOUT SCIATICA: ICD-10-CM

## 2019-06-28 LAB
ALBUMIN SERPL-MCNC: 3.9 G/DL (ref 3.4–5)
ALBUMIN UR-MCNC: NEGATIVE MG/DL
ALP SERPL-CCNC: 48 U/L (ref 40–150)
ALT SERPL W P-5'-P-CCNC: 21 U/L (ref 0–70)
ANION GAP SERPL CALCULATED.3IONS-SCNC: 5 MMOL/L (ref 3–14)
APPEARANCE UR: CLEAR
AST SERPL W P-5'-P-CCNC: 24 U/L (ref 0–45)
BACTERIA #/AREA URNS HPF: ABNORMAL /HPF
BASOPHILS # BLD AUTO: 0 10E9/L (ref 0–0.2)
BASOPHILS NFR BLD AUTO: 0.5 %
BILIRUB SERPL-MCNC: 0.7 MG/DL (ref 0.2–1.3)
BILIRUB UR QL STRIP: NEGATIVE
BUN SERPL-MCNC: 13 MG/DL (ref 7–30)
CALCIUM SERPL-MCNC: 9.9 MG/DL (ref 8.5–10.1)
CHLORIDE SERPL-SCNC: 105 MMOL/L (ref 94–109)
CO2 SERPL-SCNC: 33 MMOL/L (ref 20–32)
COLOR UR AUTO: YELLOW
CREAT SERPL-MCNC: 1.2 MG/DL (ref 0.66–1.25)
CRP SERPL-MCNC: <2.9 MG/L (ref 0–8)
DIFFERENTIAL METHOD BLD: NORMAL
EOSINOPHIL # BLD AUTO: 0.4 10E9/L (ref 0–0.7)
EOSINOPHIL NFR BLD AUTO: 4.8 %
ERYTHROCYTE [DISTWIDTH] IN BLOOD BY AUTOMATED COUNT: 12.5 % (ref 10–15)
ERYTHROCYTE [SEDIMENTATION RATE] IN BLOOD BY WESTERGREN METHOD: 2 MM/H (ref 0–20)
GFR SERPL CREATININE-BSD FRML MDRD: 65 ML/MIN/{1.73_M2}
GLUCOSE SERPL-MCNC: 88 MG/DL (ref 70–99)
GLUCOSE UR STRIP-MCNC: NEGATIVE MG/DL
HCT VFR BLD AUTO: 48.7 % (ref 40–53)
HGB BLD-MCNC: 16.4 G/DL (ref 13.3–17.7)
HGB UR QL STRIP: NEGATIVE
KETONES UR STRIP-MCNC: NEGATIVE MG/DL
LEUKOCYTE ESTERASE UR QL STRIP: NEGATIVE
LYMPHOCYTES # BLD AUTO: 1.9 10E9/L (ref 0.8–5.3)
LYMPHOCYTES NFR BLD AUTO: 25.9 %
MCH RBC QN AUTO: 32.6 PG (ref 26.5–33)
MCHC RBC AUTO-ENTMCNC: 33.7 G/DL (ref 31.5–36.5)
MCV RBC AUTO: 97 FL (ref 78–100)
MONOCYTES # BLD AUTO: 0.7 10E9/L (ref 0–1.3)
MONOCYTES NFR BLD AUTO: 9.8 %
NEUTROPHILS # BLD AUTO: 4.4 10E9/L (ref 1.6–8.3)
NEUTROPHILS NFR BLD AUTO: 59 %
NITRATE UR QL: NEGATIVE
PH UR STRIP: 7.5 PH (ref 5–7)
PLATELET # BLD AUTO: 193 10E9/L (ref 150–450)
POTASSIUM SERPL-SCNC: 4.2 MMOL/L (ref 3.4–5.3)
PROT SERPL-MCNC: 6.8 G/DL (ref 6.8–8.8)
RBC # BLD AUTO: 5.03 10E12/L (ref 4.4–5.9)
RBC #/AREA URNS AUTO: ABNORMAL /HPF
SODIUM SERPL-SCNC: 143 MMOL/L (ref 133–144)
SOURCE: ABNORMAL
SP GR UR STRIP: 1.01 (ref 1–1.03)
UROBILINOGEN UR STRIP-ACNC: 0.2 EU/DL (ref 0.2–1)
WBC # BLD AUTO: 7.5 10E9/L (ref 4–11)
WBC #/AREA URNS AUTO: ABNORMAL /HPF

## 2019-06-28 PROCEDURE — 85652 RBC SED RATE AUTOMATED: CPT | Performed by: NURSE PRACTITIONER

## 2019-06-28 PROCEDURE — 85025 COMPLETE CBC W/AUTO DIFF WBC: CPT | Performed by: NURSE PRACTITIONER

## 2019-06-28 PROCEDURE — 87086 URINE CULTURE/COLONY COUNT: CPT | Performed by: NURSE PRACTITIONER

## 2019-06-28 PROCEDURE — 84443 ASSAY THYROID STIM HORMONE: CPT | Performed by: NURSE PRACTITIONER

## 2019-06-28 PROCEDURE — 99214 OFFICE O/P EST MOD 30 MIN: CPT | Performed by: NURSE PRACTITIONER

## 2019-06-28 PROCEDURE — 86140 C-REACTIVE PROTEIN: CPT | Performed by: NURSE PRACTITIONER

## 2019-06-28 PROCEDURE — 36415 COLL VENOUS BLD VENIPUNCTURE: CPT | Performed by: NURSE PRACTITIONER

## 2019-06-28 PROCEDURE — 80053 COMPREHEN METABOLIC PANEL: CPT | Performed by: NURSE PRACTITIONER

## 2019-06-28 PROCEDURE — 81001 URINALYSIS AUTO W/SCOPE: CPT | Performed by: NURSE PRACTITIONER

## 2019-06-28 ASSESSMENT — MIFFLIN-ST. JEOR: SCORE: 1669.47

## 2019-06-28 NOTE — PROGRESS NOTES
"Subjective     Ladarius Gaytan is a 59 year old male who presents to clinic today for the following health issues:    HPI   FLANK   PAIN     Onset: 3 weeks     Description:   Character: Gnawing  Location: Right side floating kidney   Radiation: None and Back    Intensity: moderate, severe    Progression of Symptoms:  same    Accompanying Signs & Symptoms:  Fever/Chills?: no   Gas/Bloating: no   Nausea: YES  Vomitting: no   Diarrhea?: no   Constipation:no   Dysuria or Hematuria: no    History:   Trauma: no   Previous similar pain: YES- floating kidney has been causing pain for a couple of months, hasn't had pain like this for 30 years   Previous tests done: none    Precipitating factors:   Does the pain change with:     Food: no      BM: no     Urination: no     Alleviating factors:  Laying down and standing     Therapies Tried and outcome: Bowel Testing didn't help     LMP:  not applicable     Pt was seen by his primary care provider on 4/8/19 for c/o lower abdominal pain and rectal bleeding-instructed to use PPI, had neg H Pylori and FIT test. Did not start PPI as he does not think his symptoms are consistent with GERD and has no addtl rectal or GI bleeding.    Today he is here because he notes that over the past 1 month he has had intermittent pain he describes as an ache to b/l low back and right side \"near his kidney.\" Pain is improving. 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 States this has never really caused him problems such infection or renal stones but occasionally has had some pain with certain activity. He thinks his current pain is related to the floating kidney. First noticed the pain after moving a chair forward. At times feels like his kidney gets \"stuck\" and he can move it back into correct position.    Currently denies abdominal pain, gas pain, diarrhea, constipation, fever, reflux, chest pain, change in urination, " dysuria, or hematuria. Does admit to also feeling slightly nauseated, but no vomiting.He also feels like he has decreased energy and has lost 6 pounds since last OV which he attributes to nausea. Does have hx of alcohol abuse, admits to drinking 8 beers daily on weekends but has not felt like drinking alcohol in the past week.    Has appointment with urology end of July for testicular hypofunction-takes Clomid for this.    Reviewed and updated as needed this visit by Provider  Meds  Problems       Review of Systems   Otherwise ROS is negative except as stated above.      Objective    /72 (BP Location: Right arm, Patient Position: Chair, Cuff Size: Adult Regular)   Pulse 72   Temp 97.4  F (36.3  C) (Tympanic)   Ht 1.829 m (6')   Wt 81.6 kg (180 lb)   SpO2 97%   BMI 24.41 kg/m    Body mass index is 24.41 kg/m .  Physical Exam   GENERAL: healthy, alert and no distress  NECK: no adenopathy, no asymmetry, masses, or scars and thyroid normal to palpation  RESP: lungs clear to auscultation - no rales, rhonchi or wheezes  CV: regular rate and rhythm, normal S1 S2, no S3 or S4, no murmur, click or rub, no peripheral edema and peripheral pulses strong  ABDOMEN: soft, nontender, no hepatosplenomegaly, no masses and bowel sounds normal  SKIN: no suspicious lesions or rashes  BACK: no CVA tenderness, no paralumbar tenderness    Diagnostic Test Results:  Labs reviewed in Epic  Results for orders placed or performed in visit on 06/28/19 (from the past 24 hour(s))   UA with Microscopic reflex to Culture   Result Value Ref Range    Color Urine Yellow     Appearance Urine Clear     Glucose Urine Negative NEG^Negative mg/dL    Bilirubin Urine Negative NEG^Negative    Ketones Urine Negative NEG^Negative mg/dL    Specific Gravity Urine 1.015 1.003 - 1.035    pH Urine 7.5 (H) 5.0 - 7.0 pH    Protein Albumin Urine Negative NEG^Negative mg/dL    Urobilinogen Urine 0.2 0.2 - 1.0 EU/dL    Nitrite Urine Negative NEG^Negative     Blood Urine Negative NEG^Negative    Leukocyte Esterase Urine Negative NEG^Negative    Source Midstream Urine     WBC Urine 0 - 5 OTO5^0 - 5 /HPF    RBC Urine O - 2 OTO2^O - 2 /HPF    Bacteria Urine Few (A) NEG^Negative /HPF   ESR: Erythrocyte sedimentation rate   Result Value Ref Range    Sed Rate 2 0 - 20 mm/h   Comprehensive metabolic panel   Result Value Ref Range    Sodium 143 133 - 144 mmol/L    Potassium 4.2 3.4 - 5.3 mmol/L    Chloride 105 94 - 109 mmol/L    Carbon Dioxide 33 (H) 20 - 32 mmol/L    Anion Gap 5 3 - 14 mmol/L    Glucose 88 70 - 99 mg/dL    Urea Nitrogen 13 7 - 30 mg/dL    Creatinine 1.20 0.66 - 1.25 mg/dL    GFR Estimate 65 >60 mL/min/[1.73_m2]    GFR Estimate If Black 75 >60 mL/min/[1.73_m2]    Calcium 9.9 8.5 - 10.1 mg/dL    Bilirubin Total 0.7 0.2 - 1.3 mg/dL    Albumin 3.9 3.4 - 5.0 g/dL    Protein Total 6.8 6.8 - 8.8 g/dL    Alkaline Phosphatase 48 40 - 150 U/L    ALT 21 0 - 70 U/L    AST 24 0 - 45 U/L   CBC with platelets and differential   Result Value Ref Range    WBC 7.5 4.0 - 11.0 10e9/L    RBC Count 5.03 4.4 - 5.9 10e12/L    Hemoglobin 16.4 13.3 - 17.7 g/dL    Hematocrit 48.7 40.0 - 53.0 %    MCV 97 78 - 100 fl    MCH 32.6 26.5 - 33.0 pg    MCHC 33.7 31.5 - 36.5 g/dL    RDW 12.5 10.0 - 15.0 %    Platelet Count 193 150 - 450 10e9/L    % Neutrophils 59.0 %    % Lymphocytes 25.9 %    % Monocytes 9.8 %    % Eosinophils 4.8 %    % Basophils 0.5 %    Absolute Neutrophil 4.4 1.6 - 8.3 10e9/L    Absolute Lymphocytes 1.9 0.8 - 5.3 10e9/L    Absolute Monocytes 0.7 0.0 - 1.3 10e9/L    Absolute Eosinophils 0.4 0.0 - 0.7 10e9/L    Absolute Basophils 0.0 0.0 - 0.2 10e9/L    Diff Method Automated Method          Assessment & Plan   (R10.31) Abdominal pain, right lower quadrant  (primary encounter diagnosis)  Plan: UA with Microscopic reflex to Culture, CRP,         inflammation, ESR: Erythrocyte sedimentation         rate, Comprehensive metabolic panel, CBC with         platelets and differential,  Urine Culture         Aerobic Bacterial, CANCELED: Comprehensive         metabolic panel, CANCELED: CBC with platelets         and differential    (M54.5,  G89.29) Chronic bilateral low back pain without sciatica  Plan: Urine Culture Aerobic Bacterial    (R63.4) Loss of weight  Plan: CRP, inflammation, ESR: Erythrocyte         sedimentation rate, Comprehensive metabolic         panel, CBC with platelets and differential, TSH        with free T4 reflex, Urine Culture Aerobic         Bacterial, CANCELED: Comprehensive metabolic         panel, CANCELED: CBC with platelets and         differential         (R53.83) Fatigue, unspecified type  Plan: CBC with platelets and differential, TSH with         free T4 reflex, Urine Culture Aerobic Bacterial     Comments: Unusual presentation of symptoms and unsure of etiology at this point. No acute abdomen findings today. Differentials related to his report of ectopic kidney include UTI, obstruction, and renal calculi. Feel that obstruction, pyelonephritis, and calculi are less likely based on clinical presentation today in clinic. UA essentially normal but UC is pending.     Other labs pending to rule out infection, kidney/liver abnormality, thyroid dysfunction (seems less likely but done for c/o fatigue and wt loss), and inflamm markers.    If all labs normal, would recommend watchful waiting for now. If not improving in about 2-3 weeks then would consider abdominal US. Needs to be seen sooner if worsening, discussed signs/symptoms to watch for. Very possible this his abd and back pain could be something benign like a muscle strain but he does have multiple complaints today including lost weight/fatigue so needs follow-up.     See Patient Instructions    Return in about 2 weeks (around 7/12/2019) for Follow-up if symptoms do not improve or worsen.    Armida Cabrera NP  [facility]

## 2019-06-29 LAB
BACTERIA SPEC CULT: NO GROWTH
SPECIMEN SOURCE: NORMAL

## 2019-06-30 LAB — TSH SERPL DL<=0.005 MIU/L-ACNC: 1.68 MU/L (ref 0.4–4)

## 2019-07-24 ENCOUNTER — TRANSFERRED RECORDS (OUTPATIENT)
Dept: HEALTH INFORMATION MANAGEMENT | Facility: CLINIC | Age: 59
End: 2019-07-24

## 2019-08-06 ENCOUNTER — TRANSFERRED RECORDS (OUTPATIENT)
Dept: HEALTH INFORMATION MANAGEMENT | Facility: CLINIC | Age: 59
End: 2019-08-06

## 2020-07-09 ENCOUNTER — MYC MEDICAL ADVICE (OUTPATIENT)
Dept: PEDIATRICS | Facility: CLINIC | Age: 60
End: 2020-07-09

## 2020-07-10 NOTE — TELEPHONE ENCOUNTER
[facility] called patient to schedule appointment. No answer, LVM asking to return call on [facility] direct extension.    Tin Contreras at 2:16 PM on 7/10/2020  [facility] Guide  Phone [phone]

## 2020-07-10 NOTE — TELEPHONE ENCOUNTER
[facility]: Please call pt to schedule a virtual visit to address his concerns    Thank you  Lorenzo Strong RN on 7/10/2020 at 1:04 PM

## 2020-07-13 NOTE — TELEPHONE ENCOUNTER
Appointment scheduled for 07/17/2020 at 1500.    Closing encounter.    Tin Contreras at 8:56 AM on 7/13/2020  [facility] Guide  Phone [phone]

## 2020-07-17 ENCOUNTER — VIRTUAL VISIT (OUTPATIENT)
Dept: PEDIATRICS | Facility: CLINIC | Age: 60
End: 2020-07-17
Payer: COMMERCIAL

## 2020-07-17 DIAGNOSIS — F10.10 ALCOHOL ABUSE: Primary | ICD-10-CM

## 2020-07-17 NOTE — PROGRESS NOTES
"Ladarius Gaytan is a 60 year old male who is being evaluated via a billable telephone visit.      The patient has been notified of following:     \"This telephone visit will be conducted via a call between you and your physician/provider. We have found that certain health care needs can be provided without the need for a physical exam.  This service lets us provide the care you need with a short phone conversation.  If a prescription is necessary we can send it directly to your pharmacy.  If lab work is needed we can place an order for that and you can then stop by our lab to have the test done at a later time.    Telephone visits are billed at different rates depending on your insurance coverage. During this emergency period, for some insurers they may be billed the same as an in-person visit.  Please reach out to your insurance provider with any questions.    If during the course of the call the physician/provider feels a telephone visit is not appropriate, you will not be charged for this service.\"    Patient has given verbal consent for Telephone visit?  Yes    What phone number would you like to be contacted at? [phone]    How would you like to obtain your AVS? MyChart    Subjective     Ladarius Gaytan is a 60 year old male who presents via phone visit today for the following health issues:    HPI    Rib Pain            has pain in ribcage on and off for years.      Would like me to order him a \"fibroscan\" to determine possible liver damage from his past (and ongoing) alcohol use.      He did not wish for me to document much of this visit, but I told him that otherwise I could not justify this.  He did not have further problems with this after I put it that way.       Patient Active Problem List   Diagnosis     Lumbar radiculopathy     Renal cyst     Vitamin D deficiency     Alcohol abuse     Impaired fasting glucose     Erectile dysfunction     Past Surgical History:   Procedure Laterality Date     " COLONOSCOPY  2011    Procedure:COLONOSCOPY; COLONOSCOPY; Surgeon:JESE FRANKLIN; Location: GI     DISCECTOMY LUMBAR MINIMALLY INVASIVE ONE LEVEL      right L4-5 microdiscectomy     EXCISE MASS LOWER EXTREMITY  2014    Procedure: EXCISE MASS LOWER EXTREMITY;  Right Knee/tibial tubercle Mass Excision ;  Surgeon: Clifford Aguilera MD;  Location:  OR       Social History     Tobacco Use     Smoking status: Former Smoker     Packs/day: 1.00     Years: 25.00     Pack years: 25.00     Types: Cigarettes     Last attempt to quit: 10/9/2010     Years since quittin.7     Smokeless tobacco: Never Used   Substance Use Topics     Alcohol use: Yes     Comment: 3-6 beers, three times weekly.      Family History   Problem Relation Age of Onset     Unknown/Adopted Mother      Unknown/Adopted Father          Current Outpatient Medications   Medication Sig Dispense Refill     clomiPHENE (CLOMID) 50 MG tablet Take 25 mg by mouth daily       L-Carnosine POWD        Multiple Vitamins-Minerals (CENTRUM) TABS Take 1 tablet by mouth daily 30 tablet      Multiple Vitamins-Minerals (ZINC PO)        Allergies   Allergen Reactions     Ibuprofen [Aspartame-Ibuprofen] Hives and Swelling     Penicillins Hives and Swelling     No problems with Cephalexin     Aspirin      Eyes swelled     Gadolinium Hives       Reviewed and updated as needed this visit by Provider         Review of Systems   CONSTITUTIONAL: NEGATIVE for fever, chills, change in weight  INTEGUMENTARY/SKIN: NEGATIVE for worrisome rashes, moles or lesions  EYES: NEGATIVE for vision changes or irritation  ENT/MOUTH: NEGATIVE for ear, mouth and throat problems  RESP: NEGATIVE for significant cough or SOB  BREAST: NEGATIVE for masses, tenderness or discharge  CV: NEGATIVE for chest pain, palpitations or peripheral edema  GI: NEGATIVE for nausea, abdominal pain, heartburn, or change in bowel habits  : NEGATIVE for frequency, dysuria, or  hematuria  MUSCULOSKELETAL: NEGATIVE for significant arthralgias or myalgia  NEURO: NEGATIVE for weakness, dizziness or paresthesias  ENDOCRINE: NEGATIVE for temperature intolerance, skin/hair changes  HEME: NEGATIVE for bleeding problems  PSYCHIATRIC: NEGATIVE for changes in mood or affect       Objective   Reported vitals:  There were no vitals taken for this visit.   healthy, alert and no distress  PSYCH: Alert and oriented times 3; coherent speech, normal   rate and volume, able to articulate logical thoughts, able   to abstract reason, no tangential thoughts, no hallucinations   or delusions  His affect is normal  RESP: No cough, no audible wheezing, able to talk in full sentences  Remainder of exam unable to be completed due to telephone visits    Diagnostic Test Results:  Labs reviewed in Epic        Assessment/Plan:    1. Alcohol abuse  I had a difficult time understanding exactly what it was that Ladarius wanted me to do.  It sounds like he wants to be reassured that he does not have any liver cirrhosis, and does not feel that an ultrasound is sufficient.  He is therefore requesting me to order a fibro-scan, which I investigated and I am willing to order, but it seems like it would be justified only with the diagnosis of alcohol use.  He did not feel that getting liver function tests were necessary at this point, since he says that they are only take it if of when the liver is failing, and cannot  any early liver disease.  There is some merit to his case, however I did not feel that I could in good leslee not document anything about our conversation and still order a study which I feel would only be necessary after other studies were done.  At any rate, he is willing to pay for the FibroScan himself, so I ordered it.  - US Elastography Only; Future    No follow-ups on file.      Phone call duration:  15 minutes    Stevie Hood MD

## 2020-07-24 ENCOUNTER — MYC MEDICAL ADVICE (OUTPATIENT)
Dept: PEDIATRICS | Facility: CLINIC | Age: 60
End: 2020-07-24

## 2020-07-24 NOTE — TELEPHONE ENCOUNTER
Patient given phone number for [facility]'s Imaging scheduling, ph [phone].    EVELYN SALAS MA on 7/24/2020 at 12:28 PM

## 2020-07-29 ENCOUNTER — ANCILLARY PROCEDURE (OUTPATIENT)
Dept: ULTRASOUND IMAGING | Facility: CLINIC | Age: 60
End: 2020-07-29
Attending: INTERNAL MEDICINE
Payer: COMMERCIAL

## 2020-07-29 DIAGNOSIS — F10.10 ALCOHOL ABUSE: ICD-10-CM

## 2020-07-31 ENCOUNTER — MYC MEDICAL ADVICE (OUTPATIENT)
Dept: PEDIATRICS | Facility: CLINIC | Age: 60
End: 2020-07-31

## 2020-08-06 NOTE — TELEPHONE ENCOUNTER
Dr. Hood:    Please see below. Not sure if know what he Pt is referring to. I don't see any recent Covid testing nor strep testing?    Yadi Hugo, RN   [facility] -- Triage Nurse

## 2020-08-26 ENCOUNTER — OFFICE VISIT (OUTPATIENT)
Dept: URGENT CARE | Facility: URGENT CARE | Age: 60
End: 2020-08-26
Payer: COMMERCIAL

## 2020-08-26 ENCOUNTER — ANCILLARY PROCEDURE (OUTPATIENT)
Dept: GENERAL RADIOLOGY | Facility: CLINIC | Age: 60
End: 2020-08-26
Attending: PHYSICIAN ASSISTANT
Payer: COMMERCIAL

## 2020-08-26 VITALS
WEIGHT: 180.4 LBS | TEMPERATURE: 97.4 F | BODY MASS INDEX: 24.47 KG/M2 | SYSTOLIC BLOOD PRESSURE: 135 MMHG | HEART RATE: 70 BPM | DIASTOLIC BLOOD PRESSURE: 87 MMHG | OXYGEN SATURATION: 99 %

## 2020-08-26 DIAGNOSIS — R20.0 NUMBNESS AND TINGLING IN LEFT ARM: ICD-10-CM

## 2020-08-26 DIAGNOSIS — R20.0 NUMBNESS AND TINGLING IN LEFT ARM: Primary | ICD-10-CM

## 2020-08-26 DIAGNOSIS — R20.2 NUMBNESS AND TINGLING IN LEFT ARM: ICD-10-CM

## 2020-08-26 DIAGNOSIS — R20.2 NUMBNESS AND TINGLING IN LEFT ARM: Primary | ICD-10-CM

## 2020-08-26 PROCEDURE — 99214 OFFICE O/P EST MOD 30 MIN: CPT | Performed by: PHYSICIAN ASSISTANT

## 2020-08-26 PROCEDURE — 72080 X-RAY EXAM THORACOLMB 2/> VW: CPT

## 2020-08-26 PROCEDURE — 72040 X-RAY EXAM NECK SPINE 2-3 VW: CPT

## 2020-08-27 ENCOUNTER — HOSPITAL ENCOUNTER (OUTPATIENT)
Dept: CT IMAGING | Facility: CLINIC | Age: 60
End: 2020-08-27
Attending: FAMILY MEDICINE
Payer: COMMERCIAL

## 2020-08-27 ENCOUNTER — HOSPITAL ENCOUNTER (OUTPATIENT)
Dept: MRI IMAGING | Facility: CLINIC | Age: 60
End: 2020-08-27
Attending: FAMILY MEDICINE
Payer: COMMERCIAL

## 2020-08-27 ENCOUNTER — NURSE TRIAGE (OUTPATIENT)
Dept: NURSING | Facility: CLINIC | Age: 60
End: 2020-08-27

## 2020-08-27 ENCOUNTER — TRANSFERRED RECORDS (OUTPATIENT)
Dept: HEALTH INFORMATION MANAGEMENT | Facility: CLINIC | Age: 60
End: 2020-08-27

## 2020-08-27 ENCOUNTER — VIRTUAL VISIT (OUTPATIENT)
Dept: FAMILY MEDICINE | Facility: CLINIC | Age: 60
End: 2020-08-27
Payer: COMMERCIAL

## 2020-08-27 ENCOUNTER — TELEPHONE (OUTPATIENT)
Dept: FAMILY MEDICINE | Facility: CLINIC | Age: 60
End: 2020-08-27

## 2020-08-27 DIAGNOSIS — M54.12 CERVICAL RADICULOPATHY DUE TO TRAUMA: ICD-10-CM
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
## 2020-08-27 DIAGNOSIS — S16.1XXD STRAIN OF NECK MUSCLE, SUBSEQUENT ENCOUNTER: ICD-10-CM

## 2020-08-27 DIAGNOSIS — S12.600A: ICD-10-CM
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
## 2020-08-27 DIAGNOSIS — S12.601D CLOSED NONDISPLACED FRACTURE OF SEVENTH CERVICAL VERTEBRA WITH ROUTINE HEALING, UNSPECIFIED FRACTURE MORPHOLOGY, SUBSEQUENT ENCOUNTER: ICD-10-CM

## 2020-08-27 LAB
CREAT SERPL-MCNC: 1.25 MG/DL (ref 0.7–1.3)
GFR SERPL CREATININE-BSD FRML MDRD: 59 ML/MIN/1.73M2
GLUCOSE SERPL-MCNC: 141 MG/DL (ref 70–125)
INR PPP: 1.17 (ref 0.9–1.1)
POTASSIUM SERPL-SCNC: 3.8 MMOL/L (ref 3.5–5)
RADIOLOGIST FLAGS: ABNORMAL

## 2020-08-27 PROCEDURE — 72141 MRI NECK SPINE W/O DYE: CPT

## 2020-08-27 PROCEDURE — 99214 OFFICE O/P EST MOD 30 MIN: CPT | Mod: 95 | Performed by: FAMILY MEDICINE

## 2020-08-27 PROCEDURE — 72125 CT NECK SPINE W/O DYE: CPT
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
## 2020-08-27 NOTE — TELEPHONE ENCOUNTER
Patient calling stating he needs dr Thomas or Nurse to call him back asap, he was told to get another CT done today and they are saying he needs to go to [place] he states there is no way he can drive to the city or find his way around especially with no being able to move his neck. He asks that someone call him asap to further direct him what to do  [phone]
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
## 2020-08-27 NOTE — PATIENT INSTRUCTIONS
Please call [facility]: [phone] ([place], [place], etc.) or [facility]: [phone] ([place] etc.) to schedule your cervical spine CT and MRI.

## 2020-08-27 NOTE — TELEPHONE ENCOUNTER
I called the patient.  He was unable to make it to his CT angiogram appointment, because he cannot drive all the way into [place] his current condition (cannot turn his head).  Since we have no other choice, we will arrange for him to have the imaging done tomorrow.  The schedulers this evening are unable to authorize a work in for tomorrow in [place], so the patient is provided instructions to call them himself first thing in the morning.

## 2020-08-27 NOTE — PROGRESS NOTES
"Ladarius Gaytan is a 60 year old male who is being evaluated via a billable telephone visit.      The patient has been notified of following:     \"This telephone visit will be conducted via a call between you and your physician/provider. We have found that certain health care needs can be provided without the need for a physical exam.  This service lets us provide the care you need with a short phone conversation.  If a prescription is necessary we can send it directly to your pharmacy.  If lab work is needed we can place an order for that and you can then stop by our lab to have the test done at a later time.    Telephone visits are billed at different rates depending on your insurance coverage. During this emergency period, for some insurers they may be billed the same as an in-person visit.  Please reach out to your insurance provider with any questions.    If during the course of the call the physician/provider feels a telephone visit is not appropriate, you will not be charged for this service.\"    Patient has given verbal consent for Telephone visit?  Yes    What phone number would you like to be contacted at? [phone]    How would you like to obtain your AVS? MyChart    Subjective     Ladarius Gaytan is a 60 year old male who presents via phone visit today for the following health issues:    HPI  Musculoskeletal problem/pain  Onset/Duration: DOI 8/26/20  Description  Location: Left side of the neck  Joint Swelling: no  Redness: no  Pain: YES  Warmth: no  Intensity:  severe  Progression of Symptoms:  worsening  Accompanying signs and symptoms:   Fevers: no  Numbness/tingling/weakness: YES-left upper extremity  History  Trauma to the area: YES-inline skating, fell, hit head, causing instant left upper extremity symptoms  Recent illness:  no  Previous similar problem: no  Previous evaluation:  YES  Precipitating or alleviating factors:  Aggravating factors include: Moving around, attempting to turn the head to " the left  Therapies tried and outcome: rest/inactivity    Past medical, family, and social histories, medications, and allergies are reviewed and updated in Epic.  Allergies: Ibuprofen [aspartame-ibuprofen]; Penicillins; Aspirin; and Gadolinium    Review Of Systems:   Constitutional, HEENT, cardiovascular, pulmonary, gi and gu systems are negative, except as otherwise noted.    Objective:     Reported vitals:There were no vitals taken for this visit.   healthy, alert and no distress  RESP: No cough, no audible wheezing, able to talk in full sentences.  PSYCH: Alert and oriented times 3; coherent speech, normal   rate and volume, able to articulate logical thoughts, able   to abstract reason, no tangential thoughts, no hallucinations   or delusions, and affect is normal  The remainder of the exam cannot be completed due to this being a telephone visit.      Diagnostic Test Results:  CERVICAL SPINE TWO - THREE VIEWS  8/26/2020 8:44 PM      HISTORY: Numbness and tingling in left arm.     COMPARISON: None.                                                                      IMPRESSION: There is mild retrolisthesis of C3 on C4 and C5 on C6 and  mild anterolisthesis of C7 on T1. Straightening of the normal cervical  lordosis. No gross vertebral body height loss. Mild multilevel  degenerative disc disease with associated endplate spurring. There is  at least mild/mild to moderate multilevel facet atrophy. The  prevertebral soft tissues appear within normal limits.    =====    ASSESSMENT/PLAN:  (V00.111D) [facility] [employer] [employer], subsequent encounter  (primary encounter diagnosis)  (S12.600A) Traumatic closed fracture of C7 vertebra with minimal displacement, initial encounter (H)  (M54.12) Cervical radiculopathy due to trauma  Comment: Likely ligamentous injury as well  Plan: Orthopedic & Spine  Referral, MR         Cervical Spine w/o Contrast, CT Cervical Spine         w/o Contrast           MRI CERVICAL  SPINE WITHOUT CONTRAST 8/27/2020 10:43 AM                                                                       IMPRESSION:  1. Findings consistent with acute fracture involving the left C7  superior articulating process and pars interarticularis, better  characterized on CT cervical spine of same date. There is also  fracture involving the left C7 transverse process. Please see the  separate report from CT of same date for additional details.  2. Findings concerning for interspinous ligamentous injury at C6-C7.  3. Multilevel degenerative disc disease and uncovertebral and facet  arthropathy of the cervical spine, as described, with varying degrees  of spinal canal and neural foraminal stenosis. Please see the body of  the report for details.      MRI results and preliminary CT result reviewed with the patient.  The radiologist expressed concern about the location of the fracture close to the left vertebral artery, suggesting a CT angiogram.  I discussed this with the patient.  Because of his gadolinium allergy, he wants to avoid contrast in general.  I have ordered a CT angiogram and an MR angiogram without contrast.  He can work out the contrast issue with the technician.  He is scheduled to see the spine specialist on 8/31/2020.    Phone call duration:  12 minutes    Cristi Thomas MD

## 2020-08-27 NOTE — TELEPHONE ENCOUNTER
"Received urgent findings call from Radiologist at [facility], regarding CT and MRI of cervical spine. Both imaging tests done today.    CT scan showing acute fracture at C7, left side.  MRI showing interspinous ligamentous injury at C6-7.  Vertebral artery does enter just above the noted fracture at C7, however the artery is very close to the point of fracture so may want to consider a CTA to rule out any vascular damage.  Spine surgery consult is recommended.      Routing to provider to update. At this time, MRI report is \"preliminary\" but available for review. CT scan interpretation has yet to be entered in.    Phoebe Santiago RN  [facility]      "

## 2020-08-28 ENCOUNTER — COMMUNICATION - HEALTHEAST (OUTPATIENT)
Dept: NEUROLOGY | Facility: CLINIC | Age: 60
End: 2020-08-28

## 2020-08-28 ENCOUNTER — COMMUNICATION - HEALTHEAST (OUTPATIENT)
Dept: NEUROSURGERY | Facility: CLINIC | Age: 60
End: 2020-08-28

## 2020-08-28 DIAGNOSIS — S12.690A: ICD-10-CM

## 2020-08-29 ENCOUNTER — COMMUNICATION - HEALTHEAST (OUTPATIENT)
Dept: SCHEDULING | Facility: CLINIC | Age: 60
End: 2020-08-29

## 2020-08-31 ENCOUNTER — TELEPHONE (OUTPATIENT)
Dept: PEDIATRICS | Facility: CLINIC | Age: 60
End: 2020-08-31

## 2020-08-31 NOTE — TELEPHONE ENCOUNTER
Forms/Letter Request    Name of form/letter: FMLA    Have you been seen for this request: Yes - pt has neck fracture from roller blade accident/see ER and radiology notes    Do we have the form/letter: No    When is form/letter needed by: pt will drop off forms; pt just wanted to give Dr Sana HOYT that FMLA forms will be dropped off.    How would you like the form/letter returned: ask pt    Patient Notified form requests are processed in 3-5 business days:No    Okay to leave a detailed message? No Home number on file [phone] (home)

## 2020-09-01 ENCOUNTER — TELEPHONE (OUTPATIENT)
Dept: PEDIATRICS | Facility: CLINIC | Age: 60
End: 2020-09-01

## 2020-09-01 NOTE — TELEPHONE ENCOUNTER
Patient called and will be dropping FMLA forms off today ( 09/01 ). Also scheduled and Phone visit on 9/3 to discuss forms   Ivelisse Sapp CMA

## 2020-09-01 NOTE — TELEPHONE ENCOUNTER
Pt dropped off FMLA forms. Has phone appt scheduled 09/03 to discuss these and get completed.     Ivelisse Sapp CMA on 9/1/2020 at 4:14 PM

## 2020-09-03 ENCOUNTER — VIRTUAL VISIT (OUTPATIENT)
Dept: PEDIATRICS | Facility: CLINIC | Age: 60
End: 2020-09-03
Payer: COMMERCIAL

## 2020-09-03 DIAGNOSIS — S12.601S: Primary | ICD-10-CM

## 2020-09-03 PROCEDURE — 99213 OFFICE O/P EST LOW 20 MIN: CPT | Mod: 95 | Performed by: INTERNAL MEDICINE

## 2020-09-03 NOTE — PROGRESS NOTES
"Ladarius Gaytan is a 60 year old male who is being evaluated via a billable telephone visit.      The patient has been notified of following:     \"This telephone visit will be conducted via a call between you and your physician/provider. We have found that certain health care needs can be provided without the need for a physical exam.  This service lets us provide the care you need with a short phone conversation.  If a prescription is necessary we can send it directly to your pharmacy.  If lab work is needed we can place an order for that and you can then stop by our lab to have the test done at a later time.    Telephone visits are billed at different rates depending on your insurance coverage. During this emergency period, for some insurers they may be billed the same as an in-person visit.  Please reach out to your insurance provider with any questions.    If during the course of the call the physician/provider feels a telephone visit is not appropriate, you will not be charged for this service.\"    Patient has given verbal consent for Telephone visit?  Yes    What phone number would you like to be contacted at? [phone]    How would you like to obtain your AVS? Kaurhart    Subjective     Ladarius Gaytan is a 60 year old male who presents via phone visit today for the following health issues:    HPI    Patient would like to complete FMLA forms regarding neck fracture.     Was roller blading and fell.  Is taking Leave Without Pay. Needs paperwork done.       Admission Date: 8/27/2020.   Admission Diagnoses: cervical fracture   Discharge Date: 8/28/2020   Disposition: Home  Condition at Discharge: Good  Code Status: Full Code    Principal Diagnosis: Closed C7 fracture without spinal cord injury, initial encounter (H)    Discharge Diagnoses: Principal Problem:  Closed C7 fracture without spinal cord injury, initial encounter (H)  Active Problems:  Disorder of ligament      Consult/s: neurosurgery    Significant " Diagnostic Studies:   Recent Results (from the past 72 hour(s))   Basic Metabolic Panel   Result Value Ref Range   Sodium 139 136 - 145 mmol/L   Potassium 3.8 3.5 - 5.0 mmol/L   Chloride 103 98 - 107 mmol/L   CO2 26 22 - 31 mmol/L   Anion Gap, Calculation 10 5 - 18 mmol/L   Glucose 141 (H) 70 - 125 mg/dL   Calcium 9.3 8.5 - 10.5 mg/dL   BUN 12 8 - 22 mg/dL   Creatinine 1.25 0.70 - 1.30 mg/dL   GFR MDRD Af Amer >60 >60 mL/min/1.73m2   GFR MDRD Non Af Amer 59 (L) >60 mL/min/1.73m2   HM2 (CBC W/O DIFF)   Result Value Ref Range   WBC 8.7 4.0 - 11.0 thou/uL   RBC 4.55 4.40 - 6.20 mill/uL   Hemoglobin 14.8 14.0 - 18.0 g/dL   Hematocrit 43.1 40.0 - 54.0 %   MCV 95 80 - 100 fL   MCH 32.5 27.0 - 34.0 pg   MCHC 34.3 32.0 - 36.0 g/dL   RDW 12.1 11.0 - 14.5 %   Platelets 185 140 - 440 thou/uL   MPV 10.7 8.5 - 12.5 fL   INR   Result Value Ref Range   INR 1.17 (H) 0.90 - 1.10   Comprehensive Metabolic Panel   Result Value Ref Range   Sodium 142 136 - 145 mmol/L   Potassium 4.5 3.5 - 5.0 mmol/L   Chloride 109 (H) 98 - 107 mmol/L   CO2 22 22 - 31 mmol/L   Anion Gap, Calculation 11 5 - 18 mmol/L   Glucose 121 70 - 125 mg/dL   BUN 14 8 - 22 mg/dL   Creatinine 1.09 0.70 - 1.30 mg/dL   GFR MDRD Af Amer >60 >60 mL/min/1.73m2   GFR MDRD Non Af Amer >60 >60 mL/min/1.73m2   Bilirubin, Total 0.5 0.0 - 1.0 mg/dL   Calcium 8.7 8.5 - 10.5 mg/dL   Protein, Total 6.4 6.0 - 8.0 g/dL   Albumin 4.0 3.5 - 5.0 g/dL   Alkaline Phosphatase 43 (L) 45 - 120 U/L   AST 19 0 - 40 U/L   ALT 15 0 - 45 U/L     Results for orders placed or performed during the hospital encounter of 08/27/20   CTA Neck   Impression   1. No evidence for acute vascular injury.   XR Cervical Spine 2 - 3 VWS   Impression   None fracture through the left C7 superior articular facet process is not seen. Unchanged alignment including mild degenerative retrolisthesis at C5-C6. Vertebral body heights are maintained. Unchanged multilevel cervical spondylosis. No   prevertebral soft tissue  thickening.      Surgery: none    Treatments: IV hydration, analgesia: acetaminophen and oxycodone and steroids: dexamethasone    Discharge Medications:     Medication List     START taking these medications   dexAMETHasone 1.5 MG tablet  Quantity: 18 tablet  Dose: 3 mg  Commonly known as: DECADRON  3 mg, Oral, 3 times daily with meals      CONTINUE taking these medications   acetylcysteine 600 mg Cap capsule  Dose: 600 mg  Commonly known as: NAC  600 mg, Oral, 2 times daily    cholecalciferol (vitamin D3) 50 mcg (2,000 unit) Tab  Dose: 2,000 Units  2,000 Units, Oral, DAILY    clomiPHENE 50 mg tablet  Dose: 25 mg  Commonly known as: CLOMID  25 mg, Oral, DAILY    Lactobacillus rhamnosus GG 10-15 Billion cell capsule  Dose: 1 capsule  Commonly known as: CULTURELLE  1 capsule, Oral, DAILY    multivitamin therapeutic tablet  Dose: 1 tablet  1 tablet, Oral, DAILY        Reasons for changes to above med list are described below in problem based summary of hospital stay.    Discharge Instructions:  Follow up appointment with Primary Care Physician:  - Stevie Hood MD within 7-10 days  Follow up appointment with Specialist:   -Neurosurgery outpatient 2 weeks  Follow up test / procedure to do as outpatient: none  Diet: regular diet  Activity: per Neurosurgery recs  Restrictions: see Neurosurgery recs  Wound / drain care:none needed  The following tests have been done with results pending:   -    Hospital Summary:   Ladarius Gaytan is a 60 y.o. male with PMH significant for alcohol abuse with subsequent liver damage and a history of cigarette smoking who presents with C7 fracture with radicular pain and weakness in his left arm.     #C7 fracture - stable  Patient's neck pain and left arm weakness are explained by his closed C7 transverse process fracture confirmed on MRI and CT from 8/26/2020. Fortunately, his head and neck CTA were negative for acute dissection although the injury was close to the vertebral artery. His symptoms  of left arm weakness and occasional shooting pains are consistent with this injury. Neurosurgery consulted on the patient in the ED and will see him again in the morning. Patient placed in brace and started on dexamethasone. Repeat XR done and showed unchanged alignment. Neurosurgery to follow up in 2 weeks. Patient to wear brace at all times. Given activity restriction. Discharged on 3 day course of dexamethasone.     #Elevated Cr  Patient told ED staff that his kidney function was not very good in the past. However, it appeared to be largely normal except for that his GFR may be slightly low. This could be due to a little bit of dehydration given his recent injury. Corrected after IVF bolus.     #History of alcohol abuse with possible liver damage  Patient states he stopped drinking a month ago after the FibroScan which showed that he is having some liver fibrosis. However, we will keep him on the CIWA protocol overnight just to be safe. INR was very mildly elevated 2020 at 1.17.     #History of cigarette smoking  Patient quit 11 years ago.    Vital Signs in last 24 hours:   Temp: [98.1  F (36.7  C)-99.5  F (37.5  C)] 98.2  F (36.8  C)  Heart Rate: [67-87] 87  Resp: [18] 18  BP: (102-153)/(57-93) 153/93    Physical Exam:   GEN - alert, sitting in bed with brace, NAD  CV - RRR, 2+ radial pulses  PULM - no increased WOB  NEURO - A&Ox3, LT sensation and motor strength intact bilaterally    Precepted patient with Dr. Alfredo Jung MD  [facility] Resident, PGY3  [phone]      Electronically signed by Alfredo Jefferson III, MD at 2020 2:44 PM CDT       Associated attestation - Alfredo Jefferson III, MD - 2020 2:44 PM CDT    Faculty Supervision of Residents    I have examined Ladarius Gaytan, : 1960, on 2020 and the medical care has been evaluated and discussed with the resident.   I agree with the medical care provided, confirm the findings after  personally reviewing the images and labs, and agree with the plan documented in the note by Dr. Barry Jung.    Alfredo Jefferson III, MD, FAAFP  08/28/20 2:44 PM       Discharge Instructions  - documented in this encounter  Attachments  The following attachments cannot be sent through Care Everywhere.    DEXAMETHASONE TABLETS (ENGLISH)  Medications at Time of Discharge  - documented as of this encounter  Medication Sig Dispensed Refills Start Date End Date   acetylcysteine (NAC) 600 mg cap capsule   Take 600 mg by mouth 2 (two) times a day.   0       cholecalciferol, vitamin D3, 50 mcg (2,000 unit) Tab   Take 2,000 Units by mouth daily.   0       clomiPHENE (CLOMID) 50 mg tablet   Take 25 mg by mouth daily.   0       Lactobacillus rhamnosus GG (CULTURELLE) 10-15 Billion cell capsule   Take 1 capsule by mouth daily.   0       multivitamin therapeutic tablet   Take 1 tablet by mouth daily.   0       dexAMETHasone (DECADRON) 1.5 MG tablet    Indications: Closed C7 fracture without spinal cord injury, initial encounter (H) Take 3 mg by mouth 3 (three) times a day with meals for 3 days. 18 tablet   0 08/28/2020 08/31/2020   Ordered Prescriptions  - documented in this encounter  Reconcile with Patient's Chart  Prescription Sig Dispensed Refills Start Date End Date   dexAMETHasone (DECADRON) 1.5 MG tablet    Indications: Closed C7 fracture without spinal cord injury, initial encounter (H) Take 3 mg by mouth 3 (three) times a day with meals for 3 days. 18 tablet   0 08/28/2020 08/31/2020   Discharge Disposition  - documented in this encounter  Disposition Code Departure Means Destination   Home or Self Care   Wheelchair Home   Progress Notes  - documented in this encounter  Table of Contents for Progress Notes   Francisca Herzog, PT - 08/28/2020 1:37 PM CDT   Shawn Lozoya [facility] - 08/27/2020 9:45 PM CDT      Francisca Herzog, PT - 08/28/2020 1:37 PM CDT  Formatting of this note might be different from the  original.  Physical Therapy    08/28/20 1300   Visit Specifics   Eval Type Initial eval   Inital PT Consult 08/28/20   Bed/Tabs/Pad Alarm Applied Not applicable   General   Onset date 08/27/20   Chart Reviewed Yes   PT/OT Patient/Caregiver Stated Goals go home   Family/Caregiver Present Yes   Treatment Time   Gait Training 10   Precautions   Weight Bearing Status wbat   General Precautions Spinal;Back brace at all times   Home Living   Type of Home House   Home Layout Stairs to enter with rails;Multi-level   Mobility Equipment   (none)   Prior Status   Independent With All ADL's/IADL's   Indoor Mobility Independent   Lives With Spouse   Receives Help From Family   Vocational Full time employment   Device Used No   Cognition   Overall Cognitive Status WFL   RLE Assessment   RLE Assessment WFL   LLE Assessment   LLE Assessment WFL   Bed Mobility   Supine to Sit Independent;Log roll   Bed Mobility Comments up EOB w/ spouse in room   Transfer   Sit To Stand Independent   Stand To Sit Independent   Ambulation   Weight Bearing Status wbat   Distance (ft) 150'   Assistance Distant supervision;Independent   Assistive Device None   Verbal Cues Safety   Quality of Gait/Comment no LOB, edu re: spinal precautions   Pattern WFL   Stairs   Stairs Number Of Steps;Assistance;Rails;Pattern;Comment   Number Of Steps 12   Assistance Distant supervision   Rails None (Comment);Right   Pattern Reciprocal   Comment good mobility, no LOB. Edu re: sliding heel down step as needed   Endurance Deficit   Endurance Deficit No   Fall Risk   Fall Risk Low   Plan   Treatment/Interventions Eval only   Assessment   Prognosis Excellent   Problem List Orthopedic restrictions   Barriers to Discharge None   Recommendation   PT Discharge Recommendation Home with assist   PT Equipment Recommendation   (none)   PT Care Plan REVIEWED DAILY Yes, goals remain appropriate     Francisca Herzog DPT  8/28/2020     Electronically signed by Francisca Herzog  FANNY PT at 2020 1:37 PM CDT    Back to top of Progress Notes  Shawn Lozoya RPh - 2020 9:45 PM CDT  Formatting of this note might be different from the original.  Pharmacy Note - Admission Medication History    Pertinent Provider Information: none    ______________________________________________________________________    Prior To Admission (PTA) med list completed and updated in EMR.     PTA Med List   Medication Sig Last Dose     acetylcysteine (NAC) 600 mg cap capsule Take 600 mg by mouth 2 (two) times a day. 2020 at am     cholecalciferol, vitamin D3, 50 mcg (2,000 unit) Tab Take 2,000 Units by mouth daily. 2020 at am     clomiPHENE (CLOMID) 50 mg tablet Take 25 mg by mouth daily. 2020 at am     Lactobacillus rhamnosus GG (CULTURELLE) 10-15 Billion cell capsule Take 1 capsule by mouth daily. 2020 at am     multivitamin therapeutic tablet Take 1 tablet by mouth daily. 2020 at am     Information source(s): Patient and Clinic records    Summary of Changes to PTA Med List  New: none  Discontinued: none  Changed: none    Patient was asked about OTC/herbal products specifically. PTA med list reflects this.    Based on the pharmacist s assessment, the PTA med list information appears reliable    Patient appears adherent: Yes    Allergies were reviewed, assessed, and updated with the patient.     Patient does not use any multi-dose medications prior to admission.    Thank you for the opportunity to participate in the care of this patient.    Shawn Lozoya RPh  2020 9:45 PM      Electronically signed by Shawn Lozoya RPh at 2020 9:45 PM CDT    Back to top of Progress Notes  H&P Notes  - documented in this encounter  Yandel Shore MD - 2020 10:35 PM CDT  Formatting of this note might be different from the original.  Admission History and Physical   HOLDEN Calvo 1960, MRN [id number]    [facility]  No admission diagnoses are  documented for this encounter.    PCP: Stevie Hood MD, [unfilled], [phone]   Code status:  Full Code    Extended Emergency Contact Information  Primary Emergency Contact: CRYSTAL MACK  Mobile Phone: [phone]  Relation: Spouse  Secondary Emergency Contact: DARREN LIM  Mobile Phone: [phone]  Relation: Child      Assessment and Plan   Principal Problem:  Closed C7 fracture without spinal cord injury, initial encounter (H)    Ladarius Mack is a 60 y.o. male with PMH significant for alcohol abuse with subsequent liver damage and a history of cigarette smoking who presents with C7 fracture with radicular pain and weakness in his left arm.    #C7 fracture  Patient's neck pain and left arm weakness are explained by his closed C7 transverse process fracture confirmed on MRI and CT from 8/26/2020. Fortunately, his head and neck CTA were negative for acute dissection although the injury was close to the vertebral artery. His symptoms of left arm weakness and occasional shooting pains are consistent with this injury. Neurosurgery consulted on the patient in the ED and will see him again in the morning.  -Neurosurgery consulted and will see in the morning.  -Continue dexamethasone 3 mg every 6 hours.  -Neurochecks every 4 hours.  -Aspen collar.  -X-ray C-spine prior to discharge. Scheduled for tomorrow morning.  -Tylenol.    #Chronic kidney disease?  Patient told ED staff that his kidney function was not very good in the past. However, it appeared to be largely normal except for that his GFR may be slightly low. This could be due to a little bit of dehydration given his recent injury.  -500 cc NS bolus.  -CMP in the morning.    #History of alcohol abuse with possible liver damage  Patient states he stopped drinking a month ago after the FibroScan which showed that he is having some liver fibrosis. However, we will keep him on the CIWA protocol overnight just to be safe. INR was very mildly elevated 8/27/2020  at 1.17.  -CMP in the morning.  -CIWA protocol.    #History of cigarette smoking  Patient quit 11 years ago.  -No further intervention at this time.    Medication Reconciliation Status: Completed.    FEN: Regular diet.  DVT Prophylaxis: Low risk, ambulation, SCDs  Code: Full code.    Dispo: observation status for C7 fracture, anticipate discharge to Home.   Barriers to discharge: Neurosurgery consult in the morning.   Anticipated discharge date: within 24 hours               Review of Systems   CONSTITUTIONAL: NEGATIVE for fever, chills, change in weight  INTEGUMENTARY/SKIN: NEGATIVE for worrisome rashes, moles or lesions  EYES: NEGATIVE for vision changes or irritation  ENT/MOUTH: NEGATIVE for ear, mouth and throat problems  RESP: NEGATIVE for significant cough or SOB  CV: NEGATIVE for chest pain, palpitations or peripheral edema  GI: NEGATIVE for nausea, abdominal pain, heartburn, or change in bowel habits  : NEGATIVE for frequency, dysuria, or hematuria  NEURO: NEGATIVE for weakness, dizziness or paresthesias  ENDOCRINE: NEGATIVE for temperature intolerance, skin/hair changes  HEME: NEGATIVE for bleeding problems  PSYCHIATRIC: NEGATIVE for changes in mood or affect       Objective          Vitals:  No vitals were obtained today due to virtual visit.    healthy, alert and no distress  PSYCH: Alert and oriented times 3; coherent speech, normal   rate and volume, able to articulate logical thoughts, able   to abstract reason, no tangential thoughts, no hallucinations   or delusions  His affect is normal  RESP: No cough, no audible wheezing, able to talk in full sentences  Remainder of exam unable to be completed due to telephone visits            Assessment/Plan:    Assessment & Plan     Closed nondisplaced fracture of right lateral C7 vertebra, sequela  Ladarius had a traumatic injury of his C7 vertebra, likely with no vascular or spinal cord injury.  He was seen by [facility], and was recommended that it  might take up to 3 months for full healing.  He is on the phone today to fill out [facility] paperwork.  We filled out paperwork saying that he is completely unable to return to work until approximately November 27, and is during this time he will participate in physical therapy and neurosurgical follow-up.  He is not in need of any significant pain medicine currently, and has no significant neurologic deficit.  He will follow-up with me in another week for a full physical.  I performed paperwork which I am mailing to him.         There are no Patient Instructions on file for this visit.    No follow-ups on file.    Stevie Hood MD  [facility]    Phone call duration:  20 minutes

## 2020-09-10 ENCOUNTER — OFFICE VISIT (OUTPATIENT)
Dept: PEDIATRICS | Facility: CLINIC | Age: 60
End: 2020-09-10
Payer: COMMERCIAL

## 2020-09-10 VITALS
BODY MASS INDEX: 23.96 KG/M2 | DIASTOLIC BLOOD PRESSURE: 72 MMHG | WEIGHT: 176.9 LBS | OXYGEN SATURATION: 98 % | TEMPERATURE: 98.2 F | SYSTOLIC BLOOD PRESSURE: 108 MMHG | HEIGHT: 72 IN | HEART RATE: 80 BPM

## 2020-09-10 DIAGNOSIS — F10.10 ALCOHOL ABUSE: ICD-10-CM

## 2020-09-10 DIAGNOSIS — Z00.00 ROUTINE GENERAL MEDICAL EXAMINATION AT A HEALTH CARE FACILITY: Primary | ICD-10-CM

## 2020-09-10 DIAGNOSIS — Z23 NEED FOR INFLUENZA VACCINATION: ICD-10-CM

## 2020-09-10 PROCEDURE — 90471 IMMUNIZATION ADMIN: CPT | Performed by: INTERNAL MEDICINE

## 2020-09-10 PROCEDURE — 90682 RIV4 VACC RECOMBINANT DNA IM: CPT | Performed by: INTERNAL MEDICINE

## 2020-09-10 PROCEDURE — 99396 PREV VISIT EST AGE 40-64: CPT | Mod: 25 | Performed by: INTERNAL MEDICINE

## 2020-09-10 RX ORDER — CHOLECALCIFEROL (VITAMIN D3) 50 MCG
1 TABLET ORAL DAILY
COMMUNITY
Start: 2020-09-10

## 2020-09-10 ASSESSMENT — ENCOUNTER SYMPTOMS
NERVOUS/ANXIOUS: 0
HEARTBURN: 0
DIARRHEA: 0
ARTHRALGIAS: 0
DIZZINESS: 0
PALPITATIONS: 0
CHILLS: 0
COUGH: 0
WEAKNESS: 1
CONSTIPATION: 0
HEADACHES: 0
SORE THROAT: 0
PARESTHESIAS: 0
FREQUENCY: 0
DYSURIA: 0
SHORTNESS OF BREATH: 0
HEMATOCHEZIA: 0
JOINT SWELLING: 0
EYE PAIN: 0
MYALGIAS: 0
NAUSEA: 0
ABDOMINAL PAIN: 0
FEVER: 0
HEMATURIA: 0

## 2020-09-10 ASSESSMENT — MIFFLIN-ST. JEOR: SCORE: 1650.41

## 2020-09-10 NOTE — PROGRESS NOTES
SUBJECTIVE:   CC: Ladarius Gaytan is an 60 year old male who presents for preventative health visit.     Healthy Habits:     Getting at least 3 servings of Calcium per day:  Yes    Bi-annual eye exam:  Yes    Dental care twice a year:  Yes    Sleep apnea or symptoms of sleep apnea:  None    Diet:  Regular (no restrictions) and Other    Frequency of exercise:  4-5 days/week    Duration of exercise:  30-45 minutes    Taking medications regularly:  Yes    Barriers to taking medications:  None    Medication side effects:  None    PHQ-2 Total Score: 0    Additional concerns today:  No    Seeing neurosurgery next week; in meantime, remains on cervical hard collar and awaiting healing.  Still with some numbness left arm.      Otherwise feels well.  Due for colonoscopy in 2021.     Small cyst left arm.      We did FMLA paperwork on phone last week.    Had fibroscan last month; found to have cirrhosis       Today's PHQ-2 Score:   PHQ-2 (  [employer]) 9/10/2020   Q1: Little interest or pleasure in doing things 0   Q2: Feeling down, depressed or hopeless 0   PHQ-2 Score 0   Q1: Little interest or pleasure in doing things Not at all   Q2: Feeling down, depressed or hopeless Not at all   PHQ-2 Score 0     Abuse: Current or Past(Physical, Sexual or Emotional)- No  Do you feel safe in your environment? Yes    Have you ever done Advance Care Planning? (For example, a Health Directive, POLST, or a discussion with a medical provider or your loved ones about your wishes): No, advance care planning information given to patient to review.  Patient plans to discuss their wishes with loved ones or provider.      Social History     Tobacco Use     Smoking status: Former Smoker     Packs/day: 1.00     Years: 25.00     Pack years: 25.00     Types: Cigarettes     Last attempt to quit: 10/9/2010     Years since quittin.9     Smokeless tobacco: Never Used   Substance Use Topics     Alcohol use: Yes     Comment: 3-6 beers, three times  weekly.      If you drink alcohol do you typically have >3 drinks per day or >7 drinks per week? Not applicable    Alcohol Use 9/10/2020   Prescreen: >3 drinks/day or >7 drinks/week? Not Applicable   Prescreen: >3 drinks/day or >7 drinks/week? -   AUDIT SCORE  -       Last PSA: No results found for: PSA    Reviewed orders with patient. Reviewed health maintenance and updated orders accordingly - Yes  Lab work is in process  Labs reviewed in EPIC  BP Readings from Last 3 Encounters:   09/10/20 108/72   20 135/87   19 124/72    Wt Readings from Last 3 Encounters:   09/10/20 80.2 kg (176 lb 14.4 oz)   20 81.8 kg (180 lb 6.4 oz)   19 81.6 kg (180 lb)                  Patient Active Problem List   Diagnosis     Lumbar radiculopathy     Renal cyst     Vitamin D deficiency     Alcohol abuse     Impaired fasting glucose     Erectile dysfunction     Past Surgical History:   Procedure Laterality Date     COLONOSCOPY  2011    Procedure:COLONOSCOPY; COLONOSCOPY; Surgeon:JESE FRANKLIN; Location: GI     DISCECTOMY LUMBAR MINIMALLY INVASIVE ONE LEVEL      right L4-5 microdiscectomy     EXCISE MASS LOWER EXTREMITY  2014    Procedure: EXCISE MASS LOWER EXTREMITY;  Right Knee/tibial tubercle Mass Excision ;  Surgeon: Clifford Aguilera MD;  Location:  OR       Social History     Tobacco Use     Smoking status: Former Smoker     Packs/day: 1.00     Years: 25.00     Pack years: 25.00     Types: Cigarettes     Last attempt to quit: 10/9/2010     Years since quittin.9     Smokeless tobacco: Never Used   Substance Use Topics     Alcohol use: Yes     Comment: 3-6 beers, three times weekly.      Family History   Problem Relation Age of Onset     Unknown/Adopted Mother      Unknown/Adopted Father          Current Outpatient Medications   Medication Sig Dispense Refill     clomiPHENE (CLOMID) 50 MG tablet Take 25 mg by mouth daily       Multiple Vitamins-Minerals (CENTRUM) TABS Take 1 tablet  by mouth daily 30 tablet      Multiple Vitamins-Minerals (ZINC PO)        vitamin D3 (CHOLECALCIFEROL) 50 mcg (2000 units) tablet Take 1 tablet (50 mcg) by mouth daily       L-Carnosine POWD        Allergies   Allergen Reactions     Ibuprofen [Aspartame-Ibuprofen] Hives and Swelling     Penicillins Hives and Swelling     No problems with Cephalexin     Aspirin      Eyes swelled     Gadolinium Hives       Reviewed and updated as needed this visit by clinical staff         Reviewed and updated as needed this visit by Provider          History reviewed. No pertinent past medical history.   Past Surgical History:   Procedure Laterality Date     COLONOSCOPY  11/18/2011    Procedure:COLONOSCOPY; COLONOSCOPY; Surgeon:JESE FRANKLIN; Location: GI     DISCECTOMY LUMBAR MINIMALLY INVASIVE ONE LEVEL  2011    right L4-5 microdiscectomy     EXCISE MASS LOWER EXTREMITY  2/12/2014    Procedure: EXCISE MASS LOWER EXTREMITY;  Right Knee/tibial tubercle Mass Excision ;  Surgeon: Clifford Aguilera MD;  Location:  OR       Review of Systems   Constitutional: Negative for chills and fever.   HENT: Negative for congestion, ear pain, hearing loss and sore throat.    Eyes: Negative for pain and visual disturbance.   Respiratory: Negative for cough and shortness of breath.    Cardiovascular: Negative for chest pain, palpitations and peripheral edema.   Gastrointestinal: Negative for abdominal pain, constipation, diarrhea, heartburn, hematochezia and nausea.   Genitourinary: Negative for discharge, dysuria, frequency, genital sores, hematuria, impotence and urgency.   Musculoskeletal: Negative for arthralgias, joint swelling and myalgias.   Skin: Negative for rash.   Neurological: Positive for weakness. Negative for dizziness, headaches and paresthesias.   Psychiatric/Behavioral: Negative for mood changes. The patient is not nervous/anxious.      CONSTITUTIONAL: NEGATIVE for fever, chills, change in weight  INTEGUMENTARY/SKIN:  NEGATIVE for worrisome rashes, moles or lesions  EYES: NEGATIVE for vision changes or irritation  ENT: NEGATIVE for ear, mouth and throat problems  RESP: NEGATIVE for significant cough or SOB  CV: NEGATIVE for chest pain, palpitations or peripheral edema  GI: NEGATIVE for nausea, abdominal pain, heartburn, or change in bowel habits   male: negative for dysuria, hematuria, decreased urinary stream, erectile dysfunction, urethral discharge  MUSCULOSKELETAL: NEGATIVE for significant arthralgias or myalgia  NEURO: NEGATIVE for weakness, dizziness or paresthesias  PSYCHIATRIC: NEGATIVE for changes in mood or affect    OBJECTIVE:   There were no vitals taken for this visit.    Physical Exam  GENERAL: healthy, alert and no distress  EYES: Eyes grossly normal to inspection, PERRL and conjunctivae and sclerae normal  HENT: ear canals and TM's normal, nose and mouth without ulcers or lesions  NECK: no adenopathy, no asymmetry, masses, or scars and thyroid normal to palpation  RESP: lungs clear to auscultation - no rales, rhonchi or wheezes  CV: regular rate and rhythm, normal S1 S2, no S3 or S4, no murmur, click or rub, no peripheral edema and peripheral pulses strong  ABDOMEN: soft, nontender, no hepatosplenomegaly, no masses and bowel sounds normal   (male): normal male genitalia without lesions or urethral discharge, no hernia  MS: no gross musculoskeletal defects noted, no edema  SKIN: no suspicious lesions or rashes  NEURO: Normal strength and tone, mentation intact and speech normal  PSYCH: mentation appears normal, affect normal/bright    Diagnostic Test Results:  Labs reviewed in Epic    ASSESSMENT/PLAN:   1. Routine general medical examination at a health care facility  Discussed diet, exercise, testicular self exam, blood pressure, cholesterol, and need for cancer surveillance at appropriate ages.     2. Need for influenza vaccination    - ADMIN 1st VACCINE\    3. alcohol abuse:  Has quit drinking.  His  Fibroscan shows evidence of cirrhosis, and patient would like this repeated in 6 months.  I do not feel it will alter care and recommendations, but he'll call us in 6 months if he'd like to proceed.     COUNSELING:   Reviewed preventive health counseling, as reflected in patient instructions       Regular exercise       Healthy diet/nutrition       Vision screening       Hearing screening       Family planning       Safe sex practices/STD prevention       Colon cancer screening       Prostate cancer screening    Estimated body mass index is 24.47 kg/m  as calculated from the following:    Height as of 6/28/19: 1.829 m (6').    Weight as of 8/26/20: 81.8 kg (180 lb 6.4 oz).         He reports that he quit smoking about 9 years ago. His smoking use included cigarettes. He has a 25.00 pack-year smoking history. He has never used smokeless tobacco.      Counseling Resources:  ATP IV Guidelines  Pooled Cohorts Equation Calculator  FRAX Risk Assessment  ICSI Preventive Guidelines  Dietary Guidelines for Americans, 2010  USDA's MyPlate  ASA Prophylaxis  Lung CA Screening    Stevie Hood MD  [facility]

## 2020-09-10 NOTE — PATIENT INSTRUCTIONS
"Flu shot today.    Get your shingles vaccine (\"Shingrix\") at our pharmacy downstairs (or at another pharmacy), sometime this year--even if you've already received the old \"Zostavax\" shingles vaccine.  The \"Shingrix\" has better immunity and lasts longer, and is cheaper if you get it directly at a pharmacy.  You should not need an appointment.     You will need a second Shingrix anywhere from 2-6 months later.      Preventive Health Recommendations  Male Ages 50 - 64    Yearly exam:             See your health care provider every year in order to  o   Review health changes.   o   Discuss preventive care.    o   Review your medicines if your doctor has prescribed any.     Have a cholesterol test every 5 years, or more frequently if you are at risk for high cholesterol/heart disease.     Have a diabetes test (fasting glucose) every three years. If you are at risk for diabetes, you should have this test more often.     Have a colonoscopy at age 50, or have a yearly FIT test (stool test). These exams will check for colon cancer.      Talk with your health care provider about whether or not a prostate cancer screening test (PSA) is right for you.    You should be tested each year for STDs (sexually transmitted diseases), if you re at risk.     Shots: Get a flu shot each year. Get a tetanus shot every 10 years.     Nutrition:    Eat at least 5 servings of fruits and vegetables daily.     Eat whole-grain bread, whole-wheat pasta and brown rice instead of white grains and rice.     Get adequate Calcium and Vitamin D.     Lifestyle    Exercise for at least 150 minutes a week (30 minutes a day, 5 days a week). This will help you control your weight and prevent disease.     Limit alcohol to one drink per day.     No smoking.     Wear sunscreen to prevent skin cancer.     See your dentist every six months for an exam and cleaning.     See your eye doctor every 1 to 2 years.    "

## 2020-09-14 ENCOUNTER — OFFICE VISIT - HEALTHEAST (OUTPATIENT)
Dept: NEUROSURGERY | Facility: CLINIC | Age: 60
End: 2020-09-14

## 2020-09-14 ENCOUNTER — HOSPITAL ENCOUNTER (OUTPATIENT)
Dept: RADIOLOGY | Facility: HOSPITAL | Age: 60
Discharge: HOME | End: 2020-09-14

## 2020-09-14 ENCOUNTER — TRANSFERRED RECORDS (OUTPATIENT)
Dept: HEALTH INFORMATION MANAGEMENT | Facility: CLINIC | Age: 60
End: 2020-09-14

## 2020-09-14 DIAGNOSIS — S12.690A: ICD-10-CM

## 2020-09-14 ASSESSMENT — MIFFLIN-ST. JEOR: SCORE: 1655.4

## 2020-09-30 ENCOUNTER — OFFICE VISIT - HEALTHEAST (OUTPATIENT)
Dept: PHYSICAL THERAPY | Facility: REHABILITATION | Age: 60
End: 2020-09-30

## 2020-09-30 DIAGNOSIS — S12.690A: ICD-10-CM

## 2020-09-30 DIAGNOSIS — S14.3XXA INJURY OF BRACHIAL PLEXUS, INITIAL ENCOUNTER: ICD-10-CM

## 2020-10-07 ENCOUNTER — OFFICE VISIT - HEALTHEAST (OUTPATIENT)
Dept: PHYSICAL THERAPY | Facility: REHABILITATION | Age: 60
End: 2020-10-07

## 2020-10-07 DIAGNOSIS — S12.690A: ICD-10-CM

## 2020-10-07 DIAGNOSIS — S14.3XXA INJURY OF BRACHIAL PLEXUS, INITIAL ENCOUNTER: ICD-10-CM

## 2020-10-09 ENCOUNTER — HOSPITAL ENCOUNTER (OUTPATIENT)
Dept: PHYSICAL MEDICINE AND REHAB | Facility: CLINIC | Age: 60
Discharge: HOME | End: 2020-10-09
Attending: PHYSICAL MEDICINE & REHABILITATION

## 2020-10-09 ENCOUNTER — TRANSFERRED RECORDS (OUTPATIENT)
Dept: HEALTH INFORMATION MANAGEMENT | Facility: CLINIC | Age: 60
End: 2020-10-09

## 2020-10-09 DIAGNOSIS — S12.690A: ICD-10-CM

## 2020-10-12 ENCOUNTER — HOSPITAL ENCOUNTER (OUTPATIENT)
Dept: RADIOLOGY | Facility: HOSPITAL | Age: 60
Discharge: HOME | End: 2020-10-12

## 2020-10-12 ENCOUNTER — HOSPITAL ENCOUNTER (OUTPATIENT)
Dept: CT IMAGING | Facility: HOSPITAL | Age: 60
Discharge: HOME | End: 2020-10-12

## 2020-10-12 DIAGNOSIS — S12.690A: ICD-10-CM

## 2020-10-13 ENCOUNTER — COMMUNICATION - HEALTHEAST (OUTPATIENT)
Dept: SCHEDULING | Facility: CLINIC | Age: 60
End: 2020-10-13

## 2020-10-16 ENCOUNTER — OFFICE VISIT - HEALTHEAST (OUTPATIENT)
Dept: PHYSICAL THERAPY | Facility: REHABILITATION | Age: 60
End: 2020-10-16

## 2020-10-16 DIAGNOSIS — S12.690A: ICD-10-CM

## 2020-10-16 DIAGNOSIS — S14.3XXA INJURY OF BRACHIAL PLEXUS, INITIAL ENCOUNTER: ICD-10-CM

## 2020-10-19 ENCOUNTER — COMMUNICATION - HEALTHEAST (OUTPATIENT)
Dept: NEUROSURGERY | Facility: CLINIC | Age: 60
End: 2020-10-19

## 2020-10-23 ENCOUNTER — COMMUNICATION - HEALTHEAST (OUTPATIENT)
Dept: NEUROSURGERY | Facility: CLINIC | Age: 60
End: 2020-10-23

## 2020-10-23 ENCOUNTER — OFFICE VISIT - HEALTHEAST (OUTPATIENT)
Dept: PHYSICAL THERAPY | Facility: REHABILITATION | Age: 60
End: 2020-10-23

## 2020-10-23 DIAGNOSIS — S14.3XXA INJURY OF BRACHIAL PLEXUS, INITIAL ENCOUNTER: ICD-10-CM

## 2020-10-23 DIAGNOSIS — S12.690A: ICD-10-CM

## 2020-10-30 ENCOUNTER — OFFICE VISIT - HEALTHEAST (OUTPATIENT)
Dept: PHYSICAL THERAPY | Facility: REHABILITATION | Age: 60
End: 2020-10-30

## 2020-10-30 DIAGNOSIS — S12.690A: ICD-10-CM

## 2020-10-30 DIAGNOSIS — S14.3XXA INJURY OF BRACHIAL PLEXUS, INITIAL ENCOUNTER: ICD-10-CM

## 2020-11-03 ENCOUNTER — TRANSFERRED RECORDS (OUTPATIENT)
Dept: HEALTH INFORMATION MANAGEMENT | Facility: CLINIC | Age: 60
End: 2020-11-03

## 2020-11-03 ENCOUNTER — COMMUNICATION - HEALTHEAST (OUTPATIENT)
Dept: NEUROSURGERY | Facility: CLINIC | Age: 60
End: 2020-11-03

## 2020-11-03 ENCOUNTER — OFFICE VISIT - HEALTHEAST (OUTPATIENT)
Dept: NEUROSURGERY | Facility: CLINIC | Age: 60
End: 2020-11-03

## 2020-11-03 DIAGNOSIS — S12.690A: ICD-10-CM

## 2020-11-06 ENCOUNTER — TRANSFERRED RECORDS (OUTPATIENT)
Dept: HEALTH INFORMATION MANAGEMENT | Facility: CLINIC | Age: 60
End: 2020-11-06

## 2020-11-06 LAB
CREAT SERPL-MCNC: 1.06 MG/DL (ref 0.7–1.25)
GFR SERPL CREATININE-BSD FRML MDRD: 76 ML/MIN/1.73M2
GLUCOSE SERPL-MCNC: 91 MG/DL (ref 65–99)
POTASSIUM SERPL-SCNC: 4.2 MMOL/L (ref 3.5–5.3)

## 2020-11-09 ENCOUNTER — HOSPITAL ENCOUNTER (OUTPATIENT)
Dept: CT IMAGING | Facility: HOSPITAL | Age: 60
Discharge: HOME | End: 2020-11-09

## 2020-11-11 ENCOUNTER — COMMUNICATION - HEALTHEAST (OUTPATIENT)
Dept: NEUROSURGERY | Facility: CLINIC | Age: 60
End: 2020-11-11

## 2020-11-17 ENCOUNTER — OFFICE VISIT - HEALTHEAST (OUTPATIENT)
Dept: NEUROSURGERY | Facility: CLINIC | Age: 60
End: 2020-11-17

## 2020-11-17 ENCOUNTER — TRANSFERRED RECORDS (OUTPATIENT)
Dept: HEALTH INFORMATION MANAGEMENT | Facility: CLINIC | Age: 60
End: 2020-11-17

## 2020-11-17 ENCOUNTER — COMMUNICATION - HEALTHEAST (OUTPATIENT)
Dept: NEUROSURGERY | Facility: CLINIC | Age: 60
End: 2020-11-17

## 2020-11-17 DIAGNOSIS — S12.690A: ICD-10-CM

## 2020-11-17 ASSESSMENT — MIFFLIN-ST. JEOR: SCORE: 1655.4

## 2020-11-18 ENCOUNTER — OFFICE VISIT - HEALTHEAST (OUTPATIENT)
Dept: PHYSICAL THERAPY | Facility: REHABILITATION | Age: 60
End: 2020-11-18

## 2020-11-18 DIAGNOSIS — S14.3XXA INJURY OF BRACHIAL PLEXUS, INITIAL ENCOUNTER: ICD-10-CM

## 2020-11-18 DIAGNOSIS — S12.690A: ICD-10-CM

## 2020-11-25 ENCOUNTER — OFFICE VISIT - HEALTHEAST (OUTPATIENT)
Dept: PHYSICAL THERAPY | Facility: REHABILITATION | Age: 60
End: 2020-11-25

## 2020-11-25 DIAGNOSIS — S12.690A: ICD-10-CM

## 2020-11-25 DIAGNOSIS — S14.3XXA INJURY OF BRACHIAL PLEXUS, INITIAL ENCOUNTER: ICD-10-CM

## 2021-02-03 ENCOUNTER — TRANSFERRED RECORDS (OUTPATIENT)
Dept: HEALTH INFORMATION MANAGEMENT | Facility: CLINIC | Age: 61
End: 2021-02-03

## 2021-02-03 LAB
ALT SERPL-CCNC: 19 U/L (ref 7–55)
AST SERPL-CCNC: 20 U/L (ref 8–48)
CREAT SERPL-MCNC: 1.22 MG/DL (ref 0.7–1.3)
GFR SERPL CREATININE-BSD FRML MDRD: 64 ML/MIN/BSA
GLUCOSE SERPL-MCNC: 96 MG/DL (ref 70–140)
INR PPP: 1.1 (ref 0.9–1.1)
POTASSIUM SERPL-SCNC: 4.4 MMOL/L (ref 3.6–5.2)

## 2021-04-02 ENCOUNTER — NURSE TRIAGE (OUTPATIENT)
Dept: PEDIATRICS | Facility: CLINIC | Age: 61
End: 2021-04-02

## 2021-04-02 NOTE — TELEPHONE ENCOUNTER
Reason for Call:  Other call back    Detailed comments: patient is calling for some medical advise on the pain he still having at the right hip. Please call patient to discuss.     Phone Number Patient can be reached at: Cell number on file:    Telephone Information:   Mobile [phone]       Best Time: any    Can we leave a detailed message on this number? YES    Call taken on 4/2/2021 at 12:36 PM by Aparna Choudhary

## 2021-04-02 NOTE — TELEPHONE ENCOUNTER
"  Additional Information    Abdominal pain is a chronic symptom (recurrent or ongoing AND lasting > 4 weeks)    Negative: Age > 60 years    Answer Assessment - Initial Assessment Questions  1. LOCATION: \"Where does it hurt?\"       Right lower right side  2. RADIATION: \"Does the pain shoot anywhere else?\" (e.g., chest, back)      Stays in the same spot  3. ONSET: \"When did the pain begin?\" (Minutes, hours or days ago)       1.5 years ago  4. SUDDEN: \"Gradual or sudden onset?\"      gradual  5. PATTERN \"Does the pain come and go, or is it constant?\"     - If constant: \"Is it getting better, staying the same, or worsening?\"       (Note: Constant means the pain never goes away completely; most serious pain is constant and it progresses)      - If intermittent: \"How long does it last?\" \"Do you have pain now?\"      (Note: Intermittent means the pain goes away completely between bouts)      Intermittent but more constant lately unknown how long it has been constant for.   6. SEVERITY: \"How bad is the pain?\"  (e.g., Scale 1-10; mild, moderate, or severe)     - MILD (1-3): doesn't interfere with normal activities, abdomen soft and not tender to touch      - MODERATE (4-7): interferes with normal activities or awakens from sleep, tender to touch      - SEVERE (8-10): excruciating pain, doubled over, unable to do any normal activities        2-3 on a scale of 1-10. More of an achy sore pain  7. RECURRENT SYMPTOM: \"Have you ever had this type of abdominal pain before?\" If so, ask: \"When was the last time?\" and \"What happened that time?\"       Yes for the past 1.5 years  8. CAUSE: \"What do you think is causing the abdominal pain?\"      unknown  9. RELIEVING/AGGRAVATING FACTORS: \"What makes it better or worse?\" (e.g., movement, antacids, bowel movement)      None   10. OTHER SYMPTOMS: \"Has there been any vomiting, diarrhea, constipation, or urine problems?\"        10 pound weight loss, patient noticed blood with " wiping.    Protocols used: ABDOMINAL PAIN - MALE-A-OH  Bettie Snyder RN on 4/2/2021 at 1:51 PM

## 2021-04-07 NOTE — PROGRESS NOTES
"    Assessment & Plan     Abdominal pain, right lower quadrant  His symptoms seem to be related to stool patterns and possible IBS.  His x-ray today does not show any significant amount of stool, and I will begin him on a probiotic and see him back within 2 months.  If his symptoms worsen or develops acute pain with vomiting, he should go to the emergency room for a CAT scan.  - XR Abdomen 2 Views; Future    Other fatigue  We will check his thyroid and his vitamin D levels.  He did have an episode of thyroiditis in the past, which he just told me about and which therefore makes me concerned that he might have some post inflammatory hypothyroidism.  - TSH with free T4 reflex  - Vitamin D Deficiency    Special screening for malignant neoplasms, colon  He is due for colonoscopy and therefore I will order this.  He has no blood in his stool.  - GASTROENTEROLOGY ADULT REF PROCEDURE ONLY; Future             Patient Instructions   Lab work downstairs today.  Directions:  As you walk through the first floor, you'll see (on the right) first the pharmacy, then some bathrooms, then the \"Lab and Imaging\" area. Give them your name at the window there and wait for them to call you.     Get your xray on the way out today.    Colonoscopy: you can call [phone] to set up your colonoscopy.  If they have not heard from you, they may call you directly.    With respect to symptoms:  Begin with trial of a probiotic like \"Allign\" or \"Culturelle\".  You can also try yogurt with the active culture \"Lactobacillus Acidophilus\", which can help with digestion, bloating and gas.           Return in about 2 months (around 6/9/2021) for medicine followup, with me, in person.    Stevie Hood MD  [facility] ELIANA Durand is a 60 year old who presents for the following health issues abdominal pain.      HPI     Abdominal/Flank Pain  Onset/Duration: all day - dull pain everyday  Description:   Character: Dull ache and " Fullness  Location: right lower quadrant  Radiation: None, Back, Pelvic region and Scrotum  Intensity: mild  Progression of Symptoms:  worsening and constant  Accompanying Signs & Symptoms:  Fever/Chills: no  Gas/Bloating: YES  Nausea: YES  Vomitting: no  Diarrhea: YES- depends on the day  Constipation: no  Dysuria or Hematuria: no  History:   Trauma: no  Previous similar pain: YES  Previous tests done: none Stool test done last year  Precipitating factors:   Does the pain change with:     Food: YES if he eats too much. Trying to gain weight    Bowel Movement: no    Urination: no   Other factors:  Hard to tell w bowel movements up and down. More frequent  Therapies tried and outcome: None      Chronic/Recurring Back Pain Follow Up      Where is your back pain located? (Select all that apply) low back bilateral    How would you describe your back pain?  dull ache and stabbing    Where does your back pain spread? the right and left buttock and the right and left  thigh    Since your last clinic visit for back pain, how has your pain changed? always present, but gets better and worse    Does your back pain interfere with your job? YES, Numbness in legs. Drop foot is coming back    Since your last visit, have you tried any new treatment? No      How many servings of fruits and vegetables do you eat daily?  4 or more    On average, how many sweetened beverages do you drink each day (Examples: soda, juice, sweet tea, etc.  Do NOT count diet or artificially sweetened beverages)?   0    How many days per week do you exercise enough to make your heart beat faster? 3 or less    How many minutes a day do you exercise enough to make your heart beat faster? 20 - 29    How many days per week do you miss taking your medication? 0       Pain in right lower quadrant.  Pain used to be intermittent, but is now constant.  Can feel lumps on and off.  Some bloating also on left side.    He is eating a lot, but not able to gain weight.  "    Feels tired \"all the time\".  Has lack of appetite; feels like he has to force himself to eat.  Has been eating more fruits and vegetables; not snacking much.    Feels like when wakes up is still tired.         Review of Systems   CONSTITUTIONAL: NEGATIVE for fever, chills, change in weight  INTEGUMENTARY/SKIN: NEGATIVE for worrisome rashes, moles or lesions  EYES: NEGATIVE for vision changes or irritation  ENT/MOUTH: NEGATIVE for ear, mouth and throat problems  RESP: NEGATIVE for significant cough or SOB  BREAST: NEGATIVE for masses, tenderness or discharge  CV: NEGATIVE for chest pain, palpitations or peripheral edema  GI: NEGATIVE for nausea, abdominal pain, heartburn, or change in bowel habits  : NEGATIVE for frequency, dysuria, or hematuria  MUSCULOSKELETAL: NEGATIVE for significant arthralgias or myalgia  NEURO: NEGATIVE for weakness, dizziness or paresthesias  ENDOCRINE: NEGATIVE for temperature intolerance, skin/hair changes  HEME: NEGATIVE for bleeding problems  PSYCHIATRIC: NEGATIVE for changes in mood or affect      Objective    /72 (BP Location: Right arm, Patient Position: Right side, Cuff Size: Adult Large)   Pulse 80   Temp 98.4  F (36.9  C) (Temporal)   Wt 82.7 kg (182 lb 4.8 oz)   SpO2 98%   BMI 24.72 kg/m    Body mass index is 24.72 kg/m .  Physical Exam   GENERAL: healthy, alert and no distress  EYES: Eyes grossly normal to inspection, PERRL and conjunctivae and sclerae normal  HENT: ear canals and TM's normal, nose and mouth without ulcers or lesions  NECK: no adenopathy, no asymmetry, masses, or scars and thyroid normal to palpation  RESP: lungs clear to auscultation - no rales, rhonchi or wheezes  CV: regular rate and rhythm, normal S1 S2, no S3 or S4, no murmur, click or rub, no peripheral edema and peripheral pulses strong  ABDOMEN: mild tenderness right lower quadrant.  No masses.   MS: no gross musculoskeletal defects noted, no edema  SKIN: no suspicious lesions or " rashes  NEURO: Normal strength and tone, mentation intact and speech normal  PSYCH: mentation appears normal, affect normal/bright    abdominal x-ray: negative

## 2021-04-09 ENCOUNTER — ANCILLARY PROCEDURE (OUTPATIENT)
Dept: GENERAL RADIOLOGY | Facility: CLINIC | Age: 61
End: 2021-04-09
Attending: INTERNAL MEDICINE
Payer: COMMERCIAL

## 2021-04-09 ENCOUNTER — OFFICE VISIT (OUTPATIENT)
Dept: PEDIATRICS | Facility: CLINIC | Age: 61
End: 2021-04-09
Payer: COMMERCIAL

## 2021-04-09 VITALS
TEMPERATURE: 98.4 F | OXYGEN SATURATION: 98 % | BODY MASS INDEX: 24.72 KG/M2 | SYSTOLIC BLOOD PRESSURE: 110 MMHG | WEIGHT: 182.3 LBS | DIASTOLIC BLOOD PRESSURE: 72 MMHG | HEART RATE: 80 BPM

## 2021-04-09 DIAGNOSIS — R10.31 ABDOMINAL PAIN, RIGHT LOWER QUADRANT: Primary | ICD-10-CM

## 2021-04-09 DIAGNOSIS — Z12.11 SPECIAL SCREENING FOR MALIGNANT NEOPLASMS, COLON: ICD-10-CM

## 2021-04-09 DIAGNOSIS — R53.83 OTHER FATIGUE: ICD-10-CM

## 2021-04-09 DIAGNOSIS — R10.31 ABDOMINAL PAIN, RIGHT LOWER QUADRANT: ICD-10-CM

## 2021-04-09 LAB — TSH SERPL DL<=0.005 MIU/L-ACNC: 1.3 MU/L (ref 0.4–4)

## 2021-04-09 PROCEDURE — 36415 COLL VENOUS BLD VENIPUNCTURE: CPT | Performed by: INTERNAL MEDICINE

## 2021-04-09 PROCEDURE — 74019 RADEX ABDOMEN 2 VIEWS: CPT | Performed by: RADIOLOGY

## 2021-04-09 PROCEDURE — 99213 OFFICE O/P EST LOW 20 MIN: CPT | Performed by: INTERNAL MEDICINE

## 2021-04-09 PROCEDURE — 82306 VITAMIN D 25 HYDROXY: CPT | Performed by: INTERNAL MEDICINE

## 2021-04-09 PROCEDURE — 84443 ASSAY THYROID STIM HORMONE: CPT | Performed by: INTERNAL MEDICINE

## 2021-04-09 ASSESSMENT — ANXIETY QUESTIONNAIRES
7. FEELING AFRAID AS IF SOMETHING AWFUL MIGHT HAPPEN: SEVERAL DAYS
IF YOU CHECKED OFF ANY PROBLEMS ON THIS QUESTIONNAIRE, HOW DIFFICULT HAVE THESE PROBLEMS MADE IT FOR YOU TO DO YOUR WORK, TAKE CARE OF THINGS AT HOME, OR GET ALONG WITH OTHER PEOPLE: SOMEWHAT DIFFICULT
GAD7 TOTAL SCORE: 4
3. WORRYING TOO MUCH ABOUT DIFFERENT THINGS: NOT AT ALL
2. NOT BEING ABLE TO STOP OR CONTROL WORRYING: SEVERAL DAYS
5. BEING SO RESTLESS THAT IT IS HARD TO SIT STILL: NOT AT ALL
1. FEELING NERVOUS, ANXIOUS, OR ON EDGE: SEVERAL DAYS
6. BECOMING EASILY ANNOYED OR IRRITABLE: SEVERAL DAYS

## 2021-04-09 ASSESSMENT — PATIENT HEALTH QUESTIONNAIRE - PHQ9
5. POOR APPETITE OR OVEREATING: NOT AT ALL
SUM OF ALL RESPONSES TO PHQ QUESTIONS 1-9: 9

## 2021-04-09 NOTE — PATIENT INSTRUCTIONS
"Lab work downstairs today.  Directions:  As you walk through the first floor, you'll see (on the right) first the pharmacy, then some bathrooms, then the \"Lab and Imaging\" area. Give them your name at the window there and wait for them to call you.     Get your xray on the way out today.    Colonoscopy: you can call [phone] to set up your colonoscopy.  If they have not heard from you, they may call you directly.    With respect to symptoms:  Begin with trial of a probiotic like \"Allign\" or \"Culturelle\".  You can also try yogurt with the active culture \"Lactobacillus Acidophilus\", which can help with digestion, bloating and gas.       "

## 2021-04-10 LAB — DEPRECATED CALCIDIOL+CALCIFEROL SERPL-MC: 47 UG/L (ref 20–75)

## 2021-04-10 ASSESSMENT — ANXIETY QUESTIONNAIRES: GAD7 TOTAL SCORE: 4

## 2021-04-23 DIAGNOSIS — Z11.59 ENCOUNTER FOR SCREENING FOR OTHER VIRAL DISEASES: ICD-10-CM

## 2021-05-02 DIAGNOSIS — Z11.59 ENCOUNTER FOR SCREENING FOR OTHER VIRAL DISEASES: ICD-10-CM

## 2021-05-02 LAB
SARS-COV-2 RNA RESP QL NAA+PROBE: NORMAL
SPECIMEN SOURCE: NORMAL

## 2021-05-02 PROCEDURE — U0003 INFECTIOUS AGENT DETECTION BY NUCLEIC ACID (DNA OR RNA); SEVERE ACUTE RESPIRATORY SYNDROME CORONAVIRUS 2 (SARS-COV-2) (CORONAVIRUS DISEASE [COVID-19]), AMPLIFIED PROBE TECHNIQUE, MAKING USE OF HIGH THROUGHPUT TECHNOLOGIES AS DESCRIBED BY CMS-2020-01-R: HCPCS | Performed by: INTERNAL MEDICINE

## 2021-05-02 PROCEDURE — U0005 INFEC AGEN DETEC AMPLI PROBE: HCPCS | Performed by: INTERNAL MEDICINE

## 2021-05-03 LAB
LABORATORY COMMENT REPORT: NORMAL
SARS-COV-2 RNA RESP QL NAA+PROBE: NEGATIVE
SPECIMEN SOURCE: NORMAL

## 2021-05-05 ENCOUNTER — HOSPITAL ENCOUNTER (OUTPATIENT)
Facility: CLINIC | Age: 61
Discharge: HOME | End: 2021-05-05
Attending: INTERNAL MEDICINE | Admitting: INTERNAL MEDICINE
Payer: COMMERCIAL

## 2021-05-05 VITALS
SYSTOLIC BLOOD PRESSURE: 113 MMHG | TEMPERATURE: 98.1 F | HEIGHT: 72 IN | HEART RATE: 66 BPM | RESPIRATION RATE: 12 BRPM | BODY MASS INDEX: 23.7 KG/M2 | OXYGEN SATURATION: 98 % | DIASTOLIC BLOOD PRESSURE: 80 MMHG | WEIGHT: 175 LBS

## 2021-05-05 LAB — COLONOSCOPY: NORMAL

## 2021-05-05 PROCEDURE — G0500 MOD SEDAT ENDO SERVICE >5YRS: HCPCS | Performed by: INTERNAL MEDICINE

## 2021-05-05 PROCEDURE — G0121 COLON CA SCRN NOT HI RSK IND: HCPCS | Performed by: INTERNAL MEDICINE

## 2021-05-05 PROCEDURE — 45378 DIAGNOSTIC COLONOSCOPY: CPT | Performed by: INTERNAL MEDICINE

## 2021-05-05 PROCEDURE — 250N000011 HC RX IP 250 OP 636: Performed by: INTERNAL MEDICINE

## 2021-05-05 RX ORDER — PROCHLORPERAZINE MALEATE 10 MG
10 TABLET ORAL EVERY 6 HOURS PRN
Status: DISCONTINUED | OUTPATIENT
Start: 2021-05-05 | End: 2021-05-05 | Stop reason: HOSPADM

## 2021-05-05 RX ORDER — NALOXONE HYDROCHLORIDE 0.4 MG/ML
0.4 INJECTION, SOLUTION INTRAMUSCULAR; INTRAVENOUS; SUBCUTANEOUS
Status: DISCONTINUED | OUTPATIENT
Start: 2021-05-05 | End: 2021-05-05 | Stop reason: HOSPADM

## 2021-05-05 RX ORDER — NALOXONE HYDROCHLORIDE 0.4 MG/ML
0.2 INJECTION, SOLUTION INTRAMUSCULAR; INTRAVENOUS; SUBCUTANEOUS
Status: DISCONTINUED | OUTPATIENT
Start: 2021-05-05 | End: 2021-05-05 | Stop reason: HOSPADM

## 2021-05-05 RX ORDER — FENTANYL CITRATE 50 UG/ML
INJECTION, SOLUTION INTRAMUSCULAR; INTRAVENOUS PRN
Status: COMPLETED | OUTPATIENT
Start: 2021-05-05 | End: 2021-05-05

## 2021-05-05 RX ORDER — FLUMAZENIL 0.1 MG/ML
0.2 INJECTION, SOLUTION INTRAVENOUS
Status: DISCONTINUED | OUTPATIENT
Start: 2021-05-05 | End: 2021-05-05 | Stop reason: HOSPADM

## 2021-05-05 RX ORDER — LIDOCAINE 40 MG/G
CREAM TOPICAL
Status: DISCONTINUED | OUTPATIENT
Start: 2021-05-05 | End: 2021-05-05 | Stop reason: HOSPADM

## 2021-05-05 RX ORDER — ONDANSETRON 4 MG/1
4 TABLET, ORALLY DISINTEGRATING ORAL EVERY 6 HOURS PRN
Status: DISCONTINUED | OUTPATIENT
Start: 2021-05-05 | End: 2021-05-05 | Stop reason: HOSPADM

## 2021-05-05 RX ORDER — ONDANSETRON 2 MG/ML
4 INJECTION INTRAMUSCULAR; INTRAVENOUS EVERY 6 HOURS PRN
Status: DISCONTINUED | OUTPATIENT
Start: 2021-05-05 | End: 2021-05-05 | Stop reason: HOSPADM

## 2021-05-05 RX ORDER — ONDANSETRON 2 MG/ML
4 INJECTION INTRAMUSCULAR; INTRAVENOUS
Status: DISCONTINUED | OUTPATIENT
Start: 2021-05-05 | End: 2021-05-05 | Stop reason: HOSPADM

## 2021-05-05 RX ADMIN — MIDAZOLAM 2 MG: 1 INJECTION INTRAMUSCULAR; INTRAVENOUS at 08:22

## 2021-05-05 RX ADMIN — FENTANYL CITRATE 100 MCG: 50 INJECTION, SOLUTION INTRAMUSCULAR; INTRAVENOUS at 08:22

## 2021-05-05 ASSESSMENT — MIFFLIN-ST. JEOR: SCORE: 1641.79

## 2021-05-05 NOTE — LETTER
April 9, 2021      Ladarius Gaytan  [address]  [place] [address]        Dear Ladarius,     Please be aware that coverage of these services is subject to the terms and limitations of your health insurance plan.  Call member services at your health plan with any benefit or coverage questions.    Thank you for choosing [facility]. You are scheduled for the following service(s):    Date:  5/5/21             Procedure:  COLONOSCOPY  Doctor:        Dr. Alfredo Munoz   Arrival Time:  7:30  *Enter and check in at the Main Hospital Entrance*  Procedure Time:  8:00      Location:   [facility]        Endoscopy Department, First Floor         [address] [place], [place] [address]      [phone] or [phone] ([facility]) to reschedule      MIRALAX -GATORADE  PREP  Colonoscopy is the most accurate test to detect colon polyps and colon cancer; and the only test where polyps can be removed. During this procedure, a doctor examines the lining of your large intestine and rectum through a flexible tube.   Transportation  You must arrange for a ride for the day of your procedure with a responsible adult. A taxi , Uber, etc, is not an option unless you are accompanied by a responsible adult. If you fail to arrange transportation with a responsible adult, your procedure will be cancelled and rescheduled.    Purchase the  following supplies at your local pharmacy:  - 2 (two) bisacodyl tablets: each tablet contains 5 mg.  (Dulcolax  laxative NOT Dulcolax  stool softener)   - 1 (one) 8.3 oz bottle of Polyethylene Glycol (PEG) 3350 Powder   (MiraLAX , Smooth LAX , ClearLAX  or equivalent)  - 64 oz Gatorade    Regular Gatorade, Gatorade G2 , Powerade , Powerade Zero  or Pedialyte  is acceptable. Red colored flavors are not allowed; all other colors (yellow, green, orange, purple and blue) are okay. It is also okay to buy two 2.12 oz packets of powdered Gatorade that can be mixed  with water to a total volume of 64 oz of liquid.  - 1 (one) 10 oz bottle of Magnesium Citrate (Red colored flavors are not allowed)  It is also okay for you to use a 0.5 oz package of powdered magnesium citrate (17 g) mixed with 10 oz of water.      PREPARATION FOR COLONOSCOPY    7 days before:    Discontinue fiber supplements and medications containing iron. This includes Metamucil  and Fibercon ; and multivitamins with iron.    3 days before:    Begin a low-fiber diet. A low-fiber diet helps making the cleanout more effective.     Examples of a low-fiber diet include (but are not limited to): white bread, white rice, pasta, crackers, fish, chicken, eggs, ground beef, creamy peanut butter, cooked/steamed/boiled vegetables, canned fruit, bananas, melons, milk, plain yogurt cheese, salad dressing and other condiments.     The following are not allowed on a low-fiber diet: seeds, nuts, popcorn, bran, whole wheat, corn, quinoa, raw fruits and vegetables, berries and dried fruit, beans and lentils.    For additional details on low-fiber diet, please refer to the table on the last page.    2 days before:    Continue the low-fiber diet.     Drink at least 8 glasses of water throughout the day.     Stop eating solid foods at 11:45 pm.    1 day before:    In the morning: begin a clear liquid diet (liquids you can see through).     Examples of a clear liquid diet include: water, clear broth or bouillon, Gatorade, Pedialyte or Powerade, carbonated and non-carbonated soft drinks (Sprite , 7-Up , ginger ale), strained fruit juices without pulp (apple, white grape, white cranberry), Jell-O  and popsicles.     The following are not allowed on a clear liquid diet: red liquids, alcoholic beverages, dairy products (milk, creamer, and yogurt), protein shakes, creamy broths, juice with pulp and chewing tobacco.    At noon: take 2 (two) bisacodyl tablets     At 4 (and no later than 6pm): start drinking the Miralax-Gatorade preparation  (8.3 oz of Miralax mixed with 64 oz of Gatorade in a large pitcher). Drink 1(one) 8 oz glass every 15 minutes thereafter, until the mixture is gone.    COLON CLEANSING TIPS: drink adequate amounts of fluids before and after your colon cleansing to prevent dehydration. Stay near a toilet because you will have diarrhea. Even if you are sitting on the toilet, continue to drink the cleansing solution every 15 minutes. If you feel nauseous or vomit, rinse your mouth with water, take a 15 to 30-minute-break and then continue drinking the solution. You will be uncomfortable until the stool has flushed from your colon (in about 2 to 4 hours). You may feel chilled.    Day of your procedure  You may take all of your morning medications including blood pressure medications, blood thinners (if you have not been instructed to stop these by our office), methadone, anti-seizure medications with sips of water 3 hours prior to your procedure or earlier. Do not take insulin or vitamins prior to your procedure. Continue the clear liquid diet.       4 hours prior: drink 10 oz of magnesium citrate. It may be easier to drink it with a straw.    STOP consuming all liquids after that.     Do not take anything by mouth during this time.     Allow extra time to travel to your procedure as you may need to stop and use a restroom along the way.    You are ready for the procedure, if you followed all instructions and your stool is no longer formed, but clear or yellow liquid. If you are unsure whether your colon is clean, please call our office at [phone] before you leave for your appointment.    Bring the following to your procedure:  - Insurance Card/Photo ID.   - List of current medications including over-the-counter medications and supplements.   - Your rescue inhaler if you currently use one to control asthma.    Canceling or rescheduling your appointment:   If you must cancel or reschedule your appointment, please call [phone]  as soon as possible.      COLONOSCOPY PRE-PROCEDURE CHECKLIST    If you have diabetes, ask your regular doctor for diet and medication restrictions.  If you take an anticoagulant or anti-platelet medication (such as Coumadin , Lovenox , Pradaxa , Xarelto , Eliquis , etc.), please call your primary doctor for advice on holding this medication.  If you take aspirin you may continue to do so.  If you are or may be pregnant, please discuss the risks and benefits of this procedure with your doctor.        What happens during a colonoscopy?    Plan to spend up to two hours, starting at registration time, at the endoscopy center the day of your procedure. The colonoscopy takes an average of 15 to 30 minutes. Recovery time is about 30 minutes.      Before the exam:    You will change into a gown.    Your medical history and medication list will be reviewed with you, unless that has been done over the phone prior to the procedure.     A nurse will insert an intravenous (IV) line into your hand or arm.    The doctor will meet with you and will give you a consent form to sign.  During the exam:     Medicine will be given through the IV line to help you relax.     Your heart rate and oxygen levels will be monitored. If your blood pressure is low, you may be given fluids through the IV line.     The doctor will insert a flexible hollow tube, called a colonoscope, into your rectum. The scope will be advanced slowly through the large intestine (colon).    You may have a feeling of fullness or pressure.     If an abnormal tissue or a polyp is found, the doctor may remove it through the endoscope for closer examination, or biopsy. Tissue removal is painless    After the exam:           Any tissue samples removed during the exam will be sent to a lab for evaluation. It may take 5-7 working days for you to be notified of the results.     A nurse will provide you with complete discharge instructions before you leave the endoscopy  center. Be sure to ask the nurse for specific instructions if you take blood thinners such as Aspirin, Coumadin or Plavix.     The doctor will prepare a full report for you and for the physician who referred you for the procedure.     Your doctor will talk with you about the initial results of your exam.      Medication given during the exam will prohibit you from driving for the rest of the day.     Following the exam, you may resume your normal diet. Your first meal should be light, no greasy foods. Avoid alcohol until the next day.     You may resume your regular activities the day after the procedure.         LOW-FIBER DIET    Foods RECOMMENDED Foods to AVOID   Breads, Cereal, Rice and Pasta:   White bread, rolls, biscuits, croissant and tracy toast.   Waffles, Estonian toast and pancakes.   White rice, noodles, pasta, macaroni and peeled cooked potatoes.   Plain crackers and saltines.   Cooked cereals: farina, cream of rice.   Cold cereals: Puffed Rice , Rice Krispies , Corn Flakes  and Special K    Breads, Cereal, Rice and Pasta:   Breads or rolls with nuts, seeds or fruit.   Whole wheat, pumpernickel, rye breads and cornbread.   Potatoes with skin, brown or wild rice, and kasha (buckwheat).     Vegetables:   Tender cooked and canned vegetables without seeds: carrots, asparagus tips, green or wax beans, pumpkin, spinach, lima beans. Vegetables:   Raw or steamed vegetables.   Vegetables with seeds.   Sauerkraut.   Winter squash, peas, broccoli, Brussel sprouts, cabbage, onions, cauliflower, baked beans, peas and corn.   Fruits:   Strained fruit juice.   Canned fruit, except pineapple.   Ripe bananas and melon. Fruits:   Prunes and prune juice.   Raw fruits.   Dried fruits: figs, dates and raisins.   Milk/Dairy:   Milk: plain or flavored.   Yogurt, custard and ice cream.   Cheese and cottage cheese Milk/Dairy:     Meat and other proteins:   ground, well-cooked tender beef, lamb, ham, veal, pork, fish, poultry and  organ meats.   Eggs.   Peanut butter without nuts. Meat and other proteins:   Tough, fibrous meats with gristle.   Dry beans, peas and lentils.   Peanut butter with nuts.   Tofu.   Fats, Snack, Sweets, Condiments and Beverages:   Margarine, butter, oils, mayonnaise, sour cream and salad dressing, plain gravy.   Sugar, hard candy, clear jelly, honey and syrup.   Spices, cooked herbs, bouillon, broth and soups made with allowed vegetable, ketchup and mustard.   Coffee, tea and carbonated drinks.   Plain cakes, cookies and pretzels.   Gelatin, plain puddings, custard, ice cream, sherbet and popsicles. Fats, Snack, Sweets, Condiments and Beverages:   Nuts, seeds and coconut.   Jam, marmalade and preserves.   Pickles, olives, relish and horseradish.   All desserts containing nuts, seeds, dried fruit and coconut; or made from whole grains or bran.   Candy made with nuts or seeds.   Popcorn.         DIRECTIONS TO THE ENDOSCOPY DEPARTMENT    From the north ([facility])  Take [address] South, exit on [address] [address]. Get into the left hand criss, turn left (east), go one-half mile to [address] and turn left. Go north to the second stoplight, take a right on [address] and follow it to the Main Hospital entrance.    From the south ([facility])  Take 35N to the 35E split and exit on [address] [address]. On [address] [address], turn left (west) to [address]. Turn right (north) on [address]. Go north to the second stoplight, take a right on [address] and follow it to the Main Hospital entrance.    From the east via 35E ([facility])  Take [address]E south to [address] [address] exit. Turn right on [address] [address]. Go west to [address]. Turn right (north) on [address]. Go to the second stoplight, take a right on [address] to the Main Hospital entrance.    From the east via [address] ([facility])  Take [address] to [address]. Turn left (south) on  [address] to [address], turn left (east) on [address] and follow it to the Main Hospital entrance.    From the west via [address] ([place], [place])  Take [address] east to [address]. Turn right (south) on [address] to [address], turn left (east) on [address] and follow it to the Main Hospital entrance.

## 2021-05-05 NOTE — H&P
Pre-Endoscopy History and Physical     Ladarius Gaytan MRN# [id number]   YOB: 1960 Age: 60 year old     Date of Procedure: 5/5/2021  Primary care provider: Stevie Hood  Type of Endoscopy: Colonoscopy with possible biopsy, possible polypectomy  Reason for Procedure: screen  Type of Anesthesia Anticipated: Conscious Sedation    HPI:    Ladarius is a 60 year old male who will be undergoing the above procedure.      A history and physical has been performed. The patient's medications and allergies have been reviewed. The risks and benefits of the procedure and the sedation options and risks were discussed with the patient.  All questions were answered and informed consent was obtained.      He denies a personal or family history of anesthesia complications or bleeding disorders.     Patient Active Problem List   Diagnosis     Lumbar radiculopathy     Renal cyst     Vitamin D deficiency     Alcohol abuse     Impaired fasting glucose     Erectile dysfunction        History reviewed. No pertinent past medical history.     Past Surgical History:   Procedure Laterality Date     COLONOSCOPY  11/18/2011    Procedure:COLONOSCOPY; COLONOSCOPY; Surgeon:JESE FRANKLIN; Location: GI     DISCECTOMY LUMBAR MINIMALLY INVASIVE ONE LEVEL  2011    right L4-5 microdiscectomy     EXCISE MASS LOWER EXTREMITY  2/12/2014    Procedure: EXCISE MASS LOWER EXTREMITY;  Right Knee/tibial tubercle Mass Excision ;  Surgeon: Clifford Aguilera MD;  Location:  OR       Social History     Tobacco Use     Smoking status: Former Smoker     Packs/day: 1.00     Years: 25.00     Pack years: 25.00     Types: Cigarettes     Quit date: 10/9/2010     Years since quitting: 10.5     Smokeless tobacco: Never Used   Substance Use Topics     Alcohol use: Yes     Comment: 3-6 beers, three times weekly.        Family History   Problem Relation Age of Onset     Unknown/Adopted Mother      Unknown/Adopted Father        Prior to Admission medications     Medication Sig Start Date End Date Taking? Authorizing Provider   clomiPHENE (CLOMID) 50 MG tablet Take 25 mg by mouth daily   Yes Reported, Patient   L-Carnosine POWD    Yes Reported, Patient   Multiple Vitamins-Minerals (CENTRUM) TABS Take 1 tablet by mouth daily 10/16/14  Yes Flakita Mahoney MD   Multiple Vitamins-Minerals (ZINC PO)    Yes Reported, Patient   vitamin D3 (CHOLECALCIFEROL) 50 mcg (2000 units) tablet Take 1 tablet (50 mcg) by mouth daily 9/10/20  Yes Stevie Hood MD       Allergies   Allergen Reactions     Ibuprofen [Aspartame-Ibuprofen] Hives and Swelling     Penicillins Hives and Swelling     No problems with Cephalexin     Aspirin      Eyes swelled     Gadolinium Hives        REVIEW OF SYSTEMS:   5 point ROS negative except as noted above in HPI, including Gen., Resp., CV, GI &  system review.    PHYSICAL EXAM:   There were no vitals taken for this visit. Estimated body mass index is 24.72 kg/m  as calculated from the following:    Height as of 9/10/20: 1.829 m (6').    Weight as of 4/9/21: 82.7 kg (182 lb 4.8 oz).   GENERAL APPEARANCE: alert, and oriented  MENTAL STATUS: alert  AIRWAY EXAM: Mallampatti Class I (visualization of the soft palate, fauces, uvula, anterior and posterior pillars)  RESP: lungs clear to auscultation - no rales, rhonchi or wheezes  CV: regular rates and rhythm  DIAGNOSTICS:    Not indicated    IMPRESSION   ASA Class 2 - Mild systemic disease    PLAN:   Plan for Colonoscopy with possible biopsy, possible polypectomy. We discussed the risks, benefits and alternatives and the patient wished to proceed.    The above has been forwarded to the consulting provider.      Signed Electronically by: Alfredo Munoz MD  May 5, 2021

## 2021-05-25 ENCOUNTER — RECORDS - HEALTHEAST (OUTPATIENT)
Dept: ADMINISTRATIVE | Facility: CLINIC | Age: 61
End: 2021-05-25

## 2021-05-27 VITALS — DIASTOLIC BLOOD PRESSURE: 78 MMHG | RESPIRATION RATE: 72 BRPM | SYSTOLIC BLOOD PRESSURE: 128 MMHG | HEART RATE: 68 BPM

## 2021-06-05 VITALS
HEIGHT: 72 IN | BODY MASS INDEX: 24.11 KG/M2 | HEART RATE: 82 BPM | OXYGEN SATURATION: 98 % | SYSTOLIC BLOOD PRESSURE: 132 MMHG | DIASTOLIC BLOOD PRESSURE: 75 MMHG | WEIGHT: 178 LBS

## 2021-06-05 VITALS
OXYGEN SATURATION: 98 % | HEIGHT: 72 IN | HEART RATE: 83 BPM | WEIGHT: 178 LBS | SYSTOLIC BLOOD PRESSURE: 124 MMHG | BODY MASS INDEX: 24.11 KG/M2 | DIASTOLIC BLOOD PRESSURE: 77 MMHG

## 2021-06-10 ENCOUNTER — OFFICE VISIT (OUTPATIENT)
Dept: PEDIATRICS | Facility: CLINIC | Age: 61
End: 2021-06-10
Payer: COMMERCIAL

## 2021-06-10 VITALS
BODY MASS INDEX: 23.65 KG/M2 | OXYGEN SATURATION: 97 % | TEMPERATURE: 97.3 F | HEART RATE: 62 BPM | WEIGHT: 174.4 LBS | SYSTOLIC BLOOD PRESSURE: 110 MMHG | DIASTOLIC BLOOD PRESSURE: 74 MMHG

## 2021-06-10 DIAGNOSIS — R10.13 ABDOMINAL PAIN, EPIGASTRIC: Primary | ICD-10-CM

## 2021-06-10 DIAGNOSIS — G62.9 PERIPHERAL POLYNEUROPATHY: ICD-10-CM

## 2021-06-10 DIAGNOSIS — M54.16 LUMBAR RADICULOPATHY: ICD-10-CM

## 2021-06-10 LAB
FOLATE SERPL-MCNC: 29.6 NG/ML
HBA1C MFR BLD: 5.3 % (ref 0–5.6)
TSH SERPL DL<=0.005 MIU/L-ACNC: 1.64 MU/L (ref 0.4–4)
VIT B12 SERPL-MCNC: 744 PG/ML (ref 193–986)

## 2021-06-10 PROCEDURE — 82746 ASSAY OF FOLIC ACID SERUM: CPT | Performed by: INTERNAL MEDICINE

## 2021-06-10 PROCEDURE — 36415 COLL VENOUS BLD VENIPUNCTURE: CPT | Performed by: INTERNAL MEDICINE

## 2021-06-10 PROCEDURE — 83516 IMMUNOASSAY NONANTIBODY: CPT | Performed by: INTERNAL MEDICINE

## 2021-06-10 PROCEDURE — 83516 IMMUNOASSAY NONANTIBODY: CPT | Mod: 59 | Performed by: INTERNAL MEDICINE

## 2021-06-10 PROCEDURE — 83036 HEMOGLOBIN GLYCOSYLATED A1C: CPT | Performed by: INTERNAL MEDICINE

## 2021-06-10 PROCEDURE — 82607 VITAMIN B-12: CPT | Performed by: INTERNAL MEDICINE

## 2021-06-10 PROCEDURE — 99214 OFFICE O/P EST MOD 30 MIN: CPT | Performed by: INTERNAL MEDICINE

## 2021-06-10 PROCEDURE — 84443 ASSAY THYROID STIM HORMONE: CPT | Performed by: INTERNAL MEDICINE

## 2021-06-10 NOTE — PATIENT INSTRUCTIONS
"Lab work today:  We can do labs in the exam room today, or you can get them done downstairs in the lab.      If you are going downstairs:  Directions:  As you walk through the first floor, you'll see (on the right) first the pharmacy, then some bathrooms, then the \"Lab and Imaging\" area. Give them your name at the window there and wait for them to call you.     They will call you for an MRI.    May continue with chiropracter, if helping.      Would consider physical therapy or orthopedics, depending on MRI results.    Stevie Hood MD  Internal Medicine and Pediatrics     "

## 2021-06-10 NOTE — PROGRESS NOTES
"    Assessment & Plan     Abdominal pain, epigastric  His pain is better.  With his inability to gain weight, I am slightly concerned that he might have some issues with absorption, and therefore obtaining a celiac test.  He does not have diarrhea.  - Tissue transglutaminase nidhi IgA and IgG    Peripheral polyneuropathy  His peripheral neuropathy seems more related to his lumbar radiculopathy.  However he does seem to have some bilaterality, and therefore I will screen him as below.  - Vitamin B12  - Folate  - TSH with free T4 reflex  - Hemoglobin A1c    Lumbar radiculopathy  I will repeat an MRI.  He did have a microdiscectomy on the right side about 10 years ago, and now presents with some more left-sided pain.  However he does have some right-sided lower extremity neuropathy, and therefore may have some issues that require attention.  I am requesting that he see orthopedists should he have some abnormalities, since up with physical therapist  trial did not work, and he is already seeing a chiropractor.  - MR Lumbar Spine w/o Contrast; Future             Patient Instructions   Lab work today:  We can do labs in the exam room today, or you can get them done downstairs in the lab.      If you are going downstairs:  Directions:  As you walk through the first floor, you'll see (on the right) first the pharmacy, then some bathrooms, then the \"Lab and Imaging\" area. Give them your name at the window there and wait for them to call you.     They will call you for an MRI.    May continue with chiropracter, if helping.      Would consider physical therapy or orthopedics, depending on MRI results.    Stevie Hood MD  Internal Medicine and Pediatrics         Return in about 3 months (around 9/10/2021) for medicine followup, with me, in person.    Stevie Hood MD  [facility] [place] is a 60 year old who presents for the following health issues abdominal pain. Pt had a colonoscopy a month ago, " no real answer on abdominal pain. Colonoscopy was all clear. Cannot gain any weight.  Leg neuropathy is getting worse starting to have it  Leg left now. Tripping a lot    Abdomen:  Worse if is up all day.      Back problems are ongoing; seeing chiropracter.  Tried shock wave therapy.  This made it worse.  Did some tiling yesterday, and now is quite sore.      When stands at work, notes that his right foot is numb and weak.  Last few years has noted that he's been tripping more.   In past had right L45 discectomy.  Pain in back is more on the left lower back. If lies on ground, gets better.     Still off alcohol.       Still not able to gain much weight.      No diarrhea.      HPI           Review of Systems   CONSTITUTIONAL: NEGATIVE for fever, chills, change in weight  INTEGUMENTARY/SKIN: NEGATIVE for worrisome rashes, moles or lesions  EYES: NEGATIVE for vision changes or irritation  ENT/MOUTH: NEGATIVE for ear, mouth and throat problems  RESP: NEGATIVE for significant cough or SOB  BREAST: NEGATIVE for masses, tenderness or discharge  CV: NEGATIVE for chest pain, palpitations or peripheral edema  GI: NEGATIVE for nausea, abdominal pain, heartburn, or change in bowel habits  : NEGATIVE for frequency, dysuria, or hematuria  MUSCULOSKELETAL: NEGATIVE for significant arthralgias or myalgia  NEURO: NEGATIVE for weakness, dizziness or paresthesias  ENDOCRINE: NEGATIVE for temperature intolerance, skin/hair changes  HEME: NEGATIVE for bleeding problems  PSYCHIATRIC: NEGATIVE for changes in mood or affect      Objective    /74 (BP Location: Right arm, Patient Position: Sitting, Cuff Size: Adult Regular)   Pulse 62   Temp 97.3  F (36.3  C) (Tympanic)   Wt 79.1 kg (174 lb 6.4 oz)   SpO2 97%   BMI 23.65 kg/m    Body mass index is 23.65 kg/m .  Physical Exam   GENERAL: healthy, alert and no distress  EYES: Eyes grossly normal to inspection, PERRL and conjunctivae and sclerae normal  HENT: ear canals and TM's  normal, nose and mouth without ulcers or lesions  NECK: no adenopathy, no asymmetry, masses, or scars and thyroid normal to palpation  RESP: lungs clear to auscultation - no rales, rhonchi or wheezes  CV: regular rate and rhythm, normal S1 S2, no S3 or S4, no murmur, click or rub, no peripheral edema and peripheral pulses strong  ABDOMEN: soft, nontender, no hepatosplenomegaly, no masses and bowel sounds normal  MS: no gross musculoskeletal defects noted, no edema  SKIN: no suspicious lesions or rashes  NEURO: Normal strength and tone, mentation intact and speech normal  PSYCH: mentation appears normal, affect normal/bright

## 2021-06-11 LAB
TTG IGA SER-ACNC: 1 U/ML
TTG IGG SER-ACNC: <1 U/ML
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
## 2021-06-11 NOTE — TELEPHONE ENCOUNTER
Two weeks with XR for C7 fracture and NP visit.     MEGHANA Gong  [facility]surgery  O: [phone]

## 2021-06-11 NOTE — PATIENT INSTRUCTIONS - HE
Four weeks with repeat CT scan. Continue collar at all times, sharda collar for showering. You can take 1000mg three times a day for your pain. I hope that your left triceps will get better with some time. Otherwise can consider EMG to see where the nerve is pinched. In the meantime, I think you can start PT for your arm.     Call [phone] with the following symptoms:  *Worsening pain not relieved by the pain prescription given  *Worsening or new onset of weakness, or numbness and tingling  *Loss or change in your ability to control bowel or bladder function  *Change in your ability to walk, talk, see or think  *If you did not have a bowel movement before leaving the hospital and your bowels have not moved within 48 hours of your discharge    !!!! IF YOU HAVE A SERIOUS OR THREATENING EMERGENCY, CALL 911 OR COME TO THE EMERGENCY ROOM.    QUESTIONS OR CONCERNS:   OUR OFFICE HOURS ARE FROM 9:00 A.M -4:30 P.M. MONDAY-FRIDAY.  AFTER OFFICE HOURS, CALLS ARE ANSWERED BY THE ON-CALL PROVIDER. PLEASE CALL WITH ROUTINE QUESTIONS DURING OFFICE HOURS. THE ON-CALL PROVIDER WILL NOT REFILL PRESCRIPTIONS.
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
## 2021-06-12 NOTE — PROGRESS NOTES
I had the pleasure of seeing Ladarius Gaytan in follow-up in my neurosurgery clinic for his superior facet fracture at C7.  He continues to have some weakness in his triceps but his pain is improving and feels that his weakness is improving as well.    Exam he has very trace weakness in the triceps on the left and 4+ out of 5 strength on wrist flexion on the left.  Otherwise he is full strength throughout the upper and lower extremities and DTRs are intact and symmetric.    Assessment and plan:  Ladarius is a 6-year-old with a superior articulating facet fracture at C7 on the left side.  I reviewed the imaging with him and his exam findings.  I would recommend an anterior cervical discectomy and fusion if he plateaus or has no improvement.  Right now he continues to improve so would recommend that he continue physical therapy.  We will keep him off work until 24 November.  We will obtain a CT of the cervical spine without contrast.  If this shows healing of the fracture we will wean him off his collar.  He will follow-up with me as needed if he does not improve and plateaus in his weakness and strength.  Thank you for giving me the opportunity to see this very pleasant patient.  If you have any questions about Ladarius or any other patients please feel free to contact me.  Of note I spent greater than 40 minutes counseling the patient regarding his pathology and treatment plan, greater than 50 percent of which was spent in direct face to face counseling.    Yanelis Ortiz MD, FAANS
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
## 2021-06-12 NOTE — TELEPHONE ENCOUNTER
"Patient calling stating that his physical therapist has sent us multiple \"notes\" and would like to know what patient can/can't do in regards to his neck for physical therapy. He would like someone to get in contact with his physical therapist with [facility] to let her know restrictions.  "
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
## 2021-06-12 NOTE — PATIENT INSTRUCTIONS - HE
Thank you for choosing the [facility] for your EMG testing.    The ordering provider will receive your final EMG results within the next few days.  Please follow up with your provider for the results and further treatment recommendations.

## 2021-06-12 NOTE — TELEPHONE ENCOUNTER
Called and spoke with Ladarius, discussed his current restrictions and limitations. F/u with Dr. Ortiz on 11/3/2020.  Juliann Johnston RN, CNRN
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
## 2021-06-13 NOTE — TELEPHONE ENCOUNTER
Called and let Ladarius know that his fracture is almost completely healed and he can wean from his cervical collar. Instructions were provided in details to his satisfaction. He voted to keep his appt on 11/17/2020 with Dr. Ortiz.  Juliann Johnston RN, CNRN

## 2021-06-13 NOTE — PROGRESS NOTES
I the pleasure of seeing Ladarius Phillips in follow-up in my neurosurgery clinic to discuss his CT scan results.  On his CT of the cervical spine dated 11/9/2020 which shows significant healing of the left C7 superior tubular process fracture.  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  Are continues to strengthen in his triceps but occasionally does have pain in the region.  When up discussing that if he plateaus or if the pain continues we can try a left C6-7 transforaminal injection versus disc replacement.  He will call me if he wishes to proceed with disc arthroplasty or an injection.  I will clear him for all activities and he does not need to wear C-spine collar any longer.   Thank you for giving me the opportunity to see this very pleasant patient.  If you have any questions about Ladarius or any other patients please feel free to contact me.  Of note I spent greater than 25 minutes counseling the patient regarding his pathology and treatment plan, greater than 50 percent of which was in direct counseling.    Yanelis Ortiz MD, FAANS
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
## 2021-06-14 ENCOUNTER — MYC MEDICAL ADVICE (OUTPATIENT)
Dept: PEDIATRICS | Facility: CLINIC | Age: 61
End: 2021-06-14

## 2021-06-14 NOTE — TELEPHONE ENCOUNTER
In reply to recent lab work. Patient does have MRI scheudled for July 1st.    Routing to provider to advise - any further workup? Follow up visit? Referral?    OV 6/10  Lumbar radiculopathy  I will repeat an MRI.  He did have a microdiscectomy on the right side about 10 years ago, and now presents with some more left-sided pain.  However he does have some right-sided lower extremity neuropathy, and therefore may have some issues that require attention.  I am requesting that he see orthopedists should he have some abnormalities, since up with physical therapist  trial did not work, and he is already seeing a chiropractor.  - MR Lumbar Spine w/o Contrast; Future

## 2021-06-17 ENCOUNTER — MYC MEDICAL ADVICE (OUTPATIENT)
Dept: PEDIATRICS | Facility: CLINIC | Age: 61
End: 2021-06-17

## 2021-06-17 DIAGNOSIS — R63.4 WEIGHT LOSS: Primary | ICD-10-CM
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
## 2021-06-21 NOTE — LETTER
Letter by Yanelis Ortiz MD at      Author: Yanelis Ortiz MD Service: -- Author Type: --    Filed:  Encounter Date: 11/3/2020 Status: (Other)         November 3, 2020     Patient: Ladarius Gaytan   YOB: 1960   Date of Visit: 11/3/2020       To Whom It May Concern:    It is my medical opinion that Ladarius Gaytan should remain out of work until November 24, 2020.    If you have any questions or concerns, please don't hesitate to call.    Sincerely,        Electronically signed by Yanelis Ortiz MD

## 2021-06-21 NOTE — LETTER
Letter by Yanelis Ortiz MD at      Author: Yanelis Ortiz MD Service: -- Author Type: --    Filed:  Encounter Date: 11/17/2020 Status: (Other)         November 17, 2020     Patient: Ladarius Gaytan   YOB: 1960   Date of Visit: 11/17/2020       To Whom It May Concern:    It is my medical opinion that Ladarius Gaytan may return to work on 11/24/2020 full-time with no restrictions.    If you have any questions or concerns, please don't hesitate to call.    Sincerely,        Electronically signed by Yanelis Ortiz MD

## 2021-07-01 ENCOUNTER — MYC MEDICAL ADVICE (OUTPATIENT)
Dept: PEDIATRICS | Facility: CLINIC | Age: 61
End: 2021-07-01

## 2021-07-01 ENCOUNTER — HOSPITAL ENCOUNTER (OUTPATIENT)
Dept: MRI IMAGING | Facility: CLINIC | Age: 61
End: 2021-07-01
Attending: INTERNAL MEDICINE
Payer: COMMERCIAL

## 2021-07-01 ENCOUNTER — HOSPITAL ENCOUNTER (OUTPATIENT)
Dept: CT IMAGING | Facility: CLINIC | Age: 61
End: 2021-07-01
Attending: INTERNAL MEDICINE
Payer: COMMERCIAL

## 2021-07-01 DIAGNOSIS — R63.4 WEIGHT LOSS: ICD-10-CM

## 2021-07-01 DIAGNOSIS — M54.16 LUMBAR RADICULOPATHY: ICD-10-CM

## 2021-07-01 DIAGNOSIS — M54.16 LUMBAR RADICULOPATHY: Primary | ICD-10-CM

## 2021-07-01 PROCEDURE — 72148 MRI LUMBAR SPINE W/O DYE: CPT

## 2021-07-01 PROCEDURE — 71250 CT THORAX DX C-: CPT

## 2021-07-03 ENCOUNTER — MYC MEDICAL ADVICE (OUTPATIENT)
Dept: PEDIATRICS | Facility: CLINIC | Age: 61
End: 2021-07-03

## 2021-07-07 ENCOUNTER — OFFICE VISIT (OUTPATIENT)
Dept: NEUROSURGERY | Facility: CLINIC | Age: 61
End: 2021-07-07
Attending: INTERNAL MEDICINE
Payer: COMMERCIAL

## 2021-07-07 VITALS
HEART RATE: 80 BPM | BODY MASS INDEX: 24.33 KG/M2 | WEIGHT: 179.4 LBS | SYSTOLIC BLOOD PRESSURE: 120 MMHG | OXYGEN SATURATION: 96 % | DIASTOLIC BLOOD PRESSURE: 77 MMHG

## 2021-07-07 DIAGNOSIS — M54.16 LUMBAR RADICULOPATHY: ICD-10-CM

## 2021-07-07 PROCEDURE — 99203 OFFICE O/P NEW LOW 30 MIN: CPT | Performed by: PHYSICIAN ASSISTANT

## 2021-07-07 PROCEDURE — G0463 HOSPITAL OUTPT CLINIC VISIT: HCPCS | Performed by: SPECIALIST/TECHNOLOGIST

## 2021-07-07 ASSESSMENT — PAIN SCALES - GENERAL: PAINLEVEL: NO PAIN (0)

## 2021-07-07 NOTE — PROGRESS NOTES
NEUROSURGERY CLINIC CONSULT NOTE     DATE OF VISIT: 7/7/2021     SUBJECTIVE:     Ladarius Gaytan is a pleasant 61 year old male who presents to the clinic today for consultation on lumbar spine pain with left worse than right lower extremity radiculopathy. He is referred to the Neurosurgery Clinic by Dr. Hood in Primary Care. Pertinent medical history consists of a L4-L5 microdiscectomy performed at [facility] approximately ten years ago. Since that procedure he has right ankle weakness and right LE numbness.     Today, he reports a 1.5-month history of symptoms. He is now experiencing left sided symptoms that he describes as an intermittent dull ache that initiates in the midline low lumbar region and radiates distally in what sounds like the left L4 distribution. This pain is accompanied by paresthesia, numbness and perceived weakness in the same distribution. He does continue to have right sided symptoms as well that seem to be in the right L5 distribution which is also accompanied with paresthesia and numbness.  Prolonged walking and standing aggravate the symptoms, while alleviation is obtained by rest. No mechanism of injury such as trauma or a fall is associated with the onset of the pain. There are no bowel or bladder changes. He denies saddle anesthesia. He denies changes in gait, instability, or falling episodes.   He has participated in conservative therapies to include physical therapy, a Medrol Dosepak and an epidural steroid injection prior and after his last procedure which did not provide any significant long term relief, therefore, at this time he is not interested in initiating any of those modalities. He does state that he has been undergoing acoustic shockwave therapy and taking stinging metal root supplement that has provided fairly decent alleviation.          Current Outpatient Medications:      clomiPHENE (CLOMID) 50 MG tablet, Take 25 mg by mouth daily, Disp: , Rfl:      L-Carnosine POWD,  , Disp: , Rfl:      Multiple Vitamins-Minerals (CENTRUM) TABS, Take 1 tablet by mouth daily, Disp: 30 tablet, Rfl:      Multiple Vitamins-Minerals (ZINC PO), , Disp: , Rfl:      NETTLE, URTICA DIOICA, PO, , Disp: , Rfl:      vitamin D3 (CHOLECALCIFEROL) 50 mcg (2000 units) tablet, Take 1 tablet (50 mcg) by mouth daily, Disp:  , Rfl:      Allergies   Allergen Reactions     Ibuprofen [Aspartame-Ibuprofen] Hives and Swelling     Pcn [Penicillins] Hives and Swelling     No problems with Cephalexin     Aspirin      Eyes swelled     Gadolinium Hives        Past Medical History:   Diagnosis Date     Closed C7 fracture without spinal cord injury, initial encounter (H) 8/27/2020     Disorder of ligament 8/28/2020        ROS: 10 point review of symptoms are negative other than the symptoms noted above in the HPI.     Family History has been reviewed with the patient, there are no pertinent findings to presenting concern.     Past Surgical History:   Procedure Laterality Date     BACK SURGERY      micrdiskectomy done by [facility]      COLONOSCOPY  11/18/2011    Procedure:COLONOSCOPY; COLONOSCOPY; Surgeon:JESE FRANKLIN; Location: GI     COLONOSCOPY Left 5/5/2021    Procedure: COLONOSCOPY;  Surgeon: Alfredo Munoz MD;  Location:  GI     DISCECTOMY LUMBAR MINIMALLY INVASIVE ONE LEVEL  2011    right L4-5 microdiscectomy     EXCISE MASS LOWER EXTREMITY  2/12/2014    Procedure: EXCISE MASS LOWER EXTREMITY;  Right Knee/tibial tubercle Mass Excision ;  Surgeon: Clifford Aguilera MD;  Location:  OR        Social History     Tobacco Use     Smoking status: Former Smoker     Packs/day: 1.00     Years: 25.00     Pack years: 25.00     Types: Cigarettes     Quit date: 10/9/2010     Years since quitting: 10.7     Smokeless tobacco: Never Used   Substance Use Topics     Alcohol use: Yes     Comment: 3-6 beers, three times weekly.      Drug use: No        OBJECTIVE:   /77   Pulse 80   Wt 179 lb 6.4 oz (81.4 kg)    SpO2 96%   BMI 24.33 kg/m     Body mass index is 24.33 kg/m .     Imaging:     MR LUMBAR SPINE WITHOUT CONTRAST 7/1/2021 11:13 AM    Findings, per radiologist read, notable for:      1.  There is a small left paracentral herniation at L3-L4 that  slightly deforms the traversing nerve root that is new versus 2012.  Correlate for any left L4 symptoms.  2.  At L4-L5 there is a shallow right eccentric protrusion that abuts  but does not definitely displace the right L5 traversing nerve root.  Changes of prior laminotomy on the right at this level.  3.  Foraminal narrowing is mild on the left at L3-L4 and bilaterally  at L5-S1. Low-grade lateral recess and foraminal narrowing at L2-L3.     Full radiological report in chart. Imaging was reviewed with with patient today.     Exam:     Patient appears comfortable, conversational, and in no apparent distress.   Head: Normocephalic, without obvious abnormality, atraumatic, no facial asymmetry.   Eyes: conjunctivae/corneas clear. PERRL, EOM's intact.   Throat: lips, mucosa, and tongue normal; teeth and gums normal.   Neck: supple, symmetrical, trachea midline, no adenopathy and thyroid: not enlarged, symmetric, no tenderness/mass/nodules.   Lungs: clear to auscultation bilaterally.   Heart: regular rate and rhythm.   Abdomen: soft, non-tender; bowel sounds normal; no masses, no organomegaly.   Pulses: 2+ and symmetric.   Skin: Skin color, texture, turgor normal. No rashes or lesions.     CN II-XII grossly intact, alert and appropriate with conversation and following commands.   Gait is non-antalgic. Able to tandem walk. Able to walk on toes and heels without difficulty.   Cervical spine is non tender to palpation. Appropriate range of motion of neck, not concerning for lhermitte's phenomenon.   Bilateral bicep 2/4 and tricep reflexes 1/4. Sensation diminished throughout left upper extremities.     UE muscle strength  Right  Left    Deltoid  5/5  5/5    Biceps  5/5  5/5     Triceps  5/5  5/5    Hand intrinsics  5/5  5/5    Hand grasp  5/5  5/5    Ylnn signs  neg  neg      Lumbar spine is non tender to palpation.  Diminished sensation throughout lower extremities.   Bilateral patellar 1/4 and achilles reflex 1/4. Negative for pain with straight leg raise.     LE muscle strength  Right  Left    Iliopsoas (hip flexion)  5/5  5/5    Quad (knee extension)  5/5  5/5    Hamstring (knee flexion)  5/5  5/5    Gastrocnemius (PF)  5/5  5/5    Tibialis Ant. (DF)  5-/5  5/5    EHL  5/5  5/5      Negative Babinski bilaterally. Negative for clonus.   Calves are soft and non-tender bilaterally.     ASSESSMENT/PLAN:     Ladarius Gaytan is a 61 year old male who presents to the clinic for consultation on a 1.5-month history of left sided symptoms that he describes as an intermittent dull ache that initiates in the midline low lumbar region and radiates distally in what sounds like the left L4 distribution. This pain is accompanied by paresthesia, numbness and perceived weakness in the same distribution. He also continue to have right sided symptoms that seem to be in the right L5 distribution which is also accompanied with paresthesia and numbness. He did have a L4-5 microdiscectomy performed at [facility] approximately ten years ago. Since that procedure he has right ankle weakness and right LE numbness.    The patient's most recent imaging was reviewed with him today. It was explained that images show a small left paracentral herniation at L3-L4 that slightly deforms the traversing nerve root that is new versus 2012. At L4-L5 there is a shallow right eccentric protrusion that abuts  but does not definitely displace the right L5 traversing nerve root. On exam, he is noted to have appropriate strength and range of motion in conjunction with diminished lower extremity sensation. He has attempted conservative management with acoustic shockwave therapy and taking stinging metal root supplement that  has provided fairly decent alleviation.     Based on his desire to avoid physical therapy, steroids, surgical intervention and is allergic to NSAIDS, we feel that it would be in his best interest to try a conservative approach by participating in a chiropractic program to include accupuncture which he would like to proceed with. He also wishes to continue with his acoustic shockwave therapy and stinging metal root supplement    We would like to see him back as needed to further discuss possible surgical interventions or other conservative therapies. We also discussed signs of a worsening problem that he should seek being evaluated.        Respectfully,     REGINALDO Ramirez, PACLIFTON  [facility] Neurosurgery  [facility]  Tel: [phone]      Exam, imaging, and plan reviewed by Dr. Reddy.

## 2021-07-07 NOTE — LETTER
7/7/2021         RE: Ladarius Gaytan  [address]  [place] [address]        Dear Colleague,    Thank you for referring your patient, Ladarius Gaytan, to the [facility] [place]. Please see a copy of my visit note below.    NEUROSURGERY CLINIC CONSULT NOTE     DATE OF VISIT: 7/7/2021     SUBJECTIVE:     Ladarius Gaytan is a pleasant 61 year old male who presents to the clinic today for consultation on lumbar spine pain with left worse than right lower extremity radiculopathy. He is referred to the Neurosurgery Clinic by Dr. Hood in Primary Care. Pertinent medical history consists of a L4-L5 microdiscectomy performed at [facility] approximately ten years ago. Since that procedure he has right ankle weakness and right LE numbness.     Today, he reports a 1.5-month history of symptoms. He is now experiencing left sided symptoms that he describes as an intermittent dull ache that initiates in the midline low lumbar region and radiates distally in what sounds like the left L4 distribution. This pain is accompanied by paresthesia, numbness and perceived weakness in the same distribution. He does continue to have right sided symptoms as well that seem to be in the right L5 distribution which is also accompanied with paresthesia and numbness.  Prolonged walking and standing aggravate the symptoms, while alleviation is obtained by rest. No mechanism of injury such as trauma or a fall is associated with the onset of the pain. There are no bowel or bladder changes. He denies saddle anesthesia. He denies changes in gait, instability, or falling episodes.   He has participated in conservative therapies to include physical therapy, a Medrol Dosepak and an epidural steroid injection prior and after his last procedure which did not provide any significant long term relief, therefore, at this time he is not interested in initiating any of those modalities. He does state that he has been  undergoing acoustic shockwave therapy and taking stinging metal root supplement that has provided fairly decent alleviation.          Current Outpatient Medications:      clomiPHENE (CLOMID) 50 MG tablet, Take 25 mg by mouth daily, Disp: , Rfl:      L-Carnosine POWD, , Disp: , Rfl:      Multiple Vitamins-Minerals (CENTRUM) TABS, Take 1 tablet by mouth daily, Disp: 30 tablet, Rfl:      Multiple Vitamins-Minerals (ZINC PO), , Disp: , Rfl:      NETTLE, URTICA DIOICA, PO, , Disp: , Rfl:      vitamin D3 (CHOLECALCIFEROL) 50 mcg (2000 units) tablet, Take 1 tablet (50 mcg) by mouth daily, Disp:  , Rfl:      Allergies   Allergen Reactions     Ibuprofen [Aspartame-Ibuprofen] Hives and Swelling     Pcn [Penicillins] Hives and Swelling     No problems with Cephalexin     Aspirin      Eyes swelled     Gadolinium Hives        Past Medical History:   Diagnosis Date     Closed C7 fracture without spinal cord injury, initial encounter (H) 8/27/2020     Disorder of ligament 8/28/2020        ROS: 10 point review of symptoms are negative other than the symptoms noted above in the HPI.     Family History has been reviewed with the patient, there are no pertinent findings to presenting concern.     Past Surgical History:   Procedure Laterality Date     BACK SURGERY      micrdiskectomy done by [facility]      COLONOSCOPY  11/18/2011    Procedure:COLONOSCOPY; COLONOSCOPY; Surgeon:JESE FRANKLIN; Location: GI     COLONOSCOPY Left 5/5/2021    Procedure: COLONOSCOPY;  Surgeon: Alfredo Munoz MD;  Location:  GI     DISCECTOMY LUMBAR MINIMALLY INVASIVE ONE LEVEL  2011    right L4-5 microdiscectomy     EXCISE MASS LOWER EXTREMITY  2/12/2014    Procedure: EXCISE MASS LOWER EXTREMITY;  Right Knee/tibial tubercle Mass Excision ;  Surgeon: Clifford Aguilera MD;  Location:  OR        Social History     Tobacco Use     Smoking status: Former Smoker     Packs/day: 1.00     Years: 25.00     Pack years: 25.00     Types: Cigarettes      Quit date: 10/9/2010     Years since quitting: 10.7     Smokeless tobacco: Never Used   Substance Use Topics     Alcohol use: Yes     Comment: 3-6 beers, three times weekly.      Drug use: No        OBJECTIVE:   /77   Pulse 80   Wt 179 lb 6.4 oz (81.4 kg)   SpO2 96%   BMI 24.33 kg/m     Body mass index is 24.33 kg/m .     Imaging:     MR LUMBAR SPINE WITHOUT CONTRAST 7/1/2021 11:13 AM    Findings, per radiologist read, notable for:      1.  There is a small left paracentral herniation at L3-L4 that  slightly deforms the traversing nerve root that is new versus 2012.  Correlate for any left L4 symptoms.  2.  At L4-L5 there is a shallow right eccentric protrusion that abuts  but does not definitely displace the right L5 traversing nerve root.  Changes of prior laminotomy on the right at this level.  3.  Foraminal narrowing is mild on the left at L3-L4 and bilaterally  at L5-S1. Low-grade lateral recess and foraminal narrowing at L2-L3.     Full radiological report in chart. Imaging was reviewed with with patient today.     Exam:     Patient appears comfortable, conversational, and in no apparent distress.   Head: Normocephalic, without obvious abnormality, atraumatic, no facial asymmetry.   Eyes: conjunctivae/corneas clear. PERRL, EOM's intact.   Throat: lips, mucosa, and tongue normal; teeth and gums normal.   Neck: supple, symmetrical, trachea midline, no adenopathy and thyroid: not enlarged, symmetric, no tenderness/mass/nodules.   Lungs: clear to auscultation bilaterally.   Heart: regular rate and rhythm.   Abdomen: soft, non-tender; bowel sounds normal; no masses, no organomegaly.   Pulses: 2+ and symmetric.   Skin: Skin color, texture, turgor normal. No rashes or lesions.     CN II-XII grossly intact, alert and appropriate with conversation and following commands.   Gait is non-antalgic. Able to tandem walk. Able to walk on toes and heels without difficulty.   Cervical spine is non tender to palpation.  Appropriate range of motion of neck, not concerning for lhermitte's phenomenon.   Bilateral bicep 2/4 and tricep reflexes 1/4. Sensation diminished throughout left upper extremities.     UE muscle strength  Right  Left    Deltoid  5/5  5/5    Biceps  5/5  5/5    Triceps  5/5  5/5    Hand intrinsics  5/5  5/5    Hand grasp  5/5  5/5    Lynn signs  neg  neg      Lumbar spine is non tender to palpation.  Diminished sensation throughout lower extremities.   Bilateral patellar 1/4 and achilles reflex 1/4. Negative for pain with straight leg raise.     LE muscle strength  Right  Left    Iliopsoas (hip flexion)  5/5  5/5    Quad (knee extension)  5/5  5/5    Hamstring (knee flexion)  5/5  5/5    Gastrocnemius (PF)  5/5  5/5    Tibialis Ant. (DF)  5-/5  5/5    EHL  5/5  5/5      Negative Babinski bilaterally. Negative for clonus.   Calves are soft and non-tender bilaterally.     ASSESSMENT/PLAN:     Ladarius Gaytan is a 61 year old male who presents to the clinic for consultation on a 1.5-month history of left sided symptoms that he describes as an intermittent dull ache that initiates in the midline low lumbar region and radiates distally in what sounds like the left L4 distribution. This pain is accompanied by paresthesia, numbness and perceived weakness in the same distribution. He also continue to have right sided symptoms that seem to be in the right L5 distribution which is also accompanied with paresthesia and numbness. He did have a L4-5 microdiscectomy performed at [facility] approximately ten years ago. Since that procedure he has right ankle weakness and right LE numbness.    The patient's most recent imaging was reviewed with him today. It was explained that images show a small left paracentral herniation at L3-L4 that slightly deforms the traversing nerve root that is new versus 2012. At L4-L5 there is a shallow right eccentric protrusion that abuts  but does not definitely displace the right L5 traversing  nerve root. On exam, he is noted to have appropriate strength and range of motion in conjunction with diminished lower extremity sensation. He has attempted conservative management with acoustic shockwave therapy and taking stinging metal root supplement that has provided fairly decent alleviation.     Based on his desire to avoid physical therapy, steroids, surgical intervention and is allergic to NSAIDS, we feel that it would be in his best interest to try a conservative approach by participating in a chiropractic program to include accupuncture which he would like to proceed with. He also wishes to continue with his acoustic shockwave therapy and stinging metal root supplement    We would like to see him back as needed to further discuss possible surgical interventions or other conservative therapies. We also discussed signs of a worsening problem that he should seek being evaluated.        Respectfully,     REGINALDO Ramirez PA-C  [facility] Neurosurgery  [facility]  Tel: [phone]      Exam, imaging, and plan reviewed by Dr. Reddy.       Again, thank you for allowing me to participate in the care of your patient.        Sincerely,        Raman Pedraza PA-C

## 2021-07-07 NOTE — NURSING NOTE
July 7, 2021 11:33 AM   Ladarius Gaytan is a 61 year old male who presents for:    Chief Complaint   Patient presents with     Consult     Lumbar redic     Initial Vitals: /77   Pulse 80   Wt 179 lb 6.4 oz (81.4 kg)   SpO2 96%   BMI 24.33 kg/m   Estimated body mass index is 24.33 kg/m  as calculated from the following:    Height as of 5/5/21: 6' (1.829 m).    Weight as of this encounter: 179 lb 6.4 oz (81.4 kg). Body surface area is 2.03 meters squared.  No Pain (0) Comment: Data Unavailable       Clinical concerns: Lumbar Radiculopathy  Terry Cerna, ATC

## 2021-09-02 NOTE — TELEPHONE ENCOUNTER
Department of Pediatrics  Pediatric Resident   History and Physical    Patient - Yusuf Nichols   MRN -  [id number]   Johnny # - [de-identified]   - 2020      Date of Admission -  2021  9:59 AM     Primary Care Physician - Karthik Barrios MD        CHIEF COMPLAINT: increased work of breathing, fever, cough    History Obtained From:  mother, father    HISTORY OF PRESENT ILLNESS:              Lorna Kahn" is a 15 m.o. male with significant past medical history of prematurity (born at 33+3 wks) with a NICU stay and bronchopulmonary dysplasia (BPD) who presents with increased work of breathing, cough, congestion, and fever x2 days. Tmax 103.8. They were giving tylenol for fever control at home. He started having symptoms Tuesday evening and this morning his work of breathing was significantly worse and he was having retractions and grunting, so parents brought him to the ED at [address]. Normal oral intake and normal urine output at home per Mom. ER course: Initially ER staff was concerned for sepsis so they intended to do a workup for sepsis rule out. They were unable to get a blood culture due to losing IV access. Bicarb was mildly decreased (15). CXR concerning for right upper lobe opacity consistent with atelectasis and bilateral perihilar infiltrates. He also received cool saline mist nebulizer and ventimask. Due to continued increased work of breathing and tachypnea, he was transferred to [facility]. V's for inpatient admission.    ER labs: rapid covid neg, rapid RSV neg, Flu Ag neg, resp panel + parainfluenza 2, CXR, CBC with diff, CMP, procal, lactic acid, telemetry      Past Medical History:       Diagnosis Date    BPD (bronchopulmonary dysplasia)     pulmonologist: Dr. Angelina Crane, pre-op clearance on file for circumcision, hernia repair    Foster child     to [facility], they are looking to adopt patient    GERD (gastroesophageal reflux disease)     Heart murmur     per foster mom, has Reason for call:  Symptom   Symptom or request: Back/flank pain    Duration (how long have symptoms been present): seen on 4/8 for this and it is getting worse and spreading.  No openings until mid July with any [facility] provider.   Have you been treated for this before? Yes    Additional comments: would like to speak to a nurse    Phone number to reach patient:  Home number on file [phone] (home)    Best Time:  any    Can we leave a detailed message on this number?  YES     followed with pediatric cardiology, and to f/u after [age] year of age, echo on file in epic    Prematurity     Respiratory failure, post-operative ([facility] [address] 04/14/2021    Snbrian     denies apnea      Parents report that Chioma Junior is a foster child who has lived with them since discharge from the NICU. He was born at 27+3. Maternal Hep C +. NICU stay of 62 days complicated by BPD. He was sent home on O2 but this was dc'ed a couple months after discharge. He has regular follow-up with NICU and pulmonology. His most recent pulm appt was last week with no new management. He is currently not on any medications, nebulizers, or home oxygen. Parents deny any other significant past medical history. Of note, in April, Chioma Junior had hernia repair and circumcision and had bronchospasm and stridor upon extubation. He was re-intubated and brought to PICU on the ventilator. He was extubated the following day. He has had no hospital admissions since that time. Chioma Junior is up-to-date on all his vaccinations. He is not in  and has not had any recent sick contacts. Past Surgical History:        Procedure Laterality Date    CIRCUMCISION  04/14/2021    CIRCUMCISION N/A 4/14/2021    CIRCUMCISION PEDIATRIC performed by Greg Barnard MD at [address] [facility] N/A 4/14/2021    BILATERAL INGUINAL HERNIA REPAIR, WITH GROIN LAPAROSCOPY performed by Greg Barnard MD at [address] Bilateral 04/14/2021    BILATERAL INGUINAL HERNIA REPAIR, WITH GROIN LAPAROSCOPY       Medications Prior to Admission:   Prior to Admission medications    Medication Sig Start Date End Date Taking? Authorizing Provider   ibuprofen (CHILDRENS ADVIL) 100 MG/5ML suspension Take 1.9 mLs by mouth every 6 hours for 2 days After 2 days, can give as needed every 6 hours.  4/14/21 7/13/21  Greg Barnard MD   acetaminophen (TYLENOL) 160 MG/5ML suspension Take 3.61 mLs by mouth every 6 hours for 2 days After 2 days, can give only as needed every 6 hours. 21  Rajiv Ragland MD   pediatric multivitamin-iron (POLY-VI-SOL WITH IRON) solution Take 0.5 mLs by mouth 2 times daily  Patient not taking: Reported on 2021   Rocael Current, APRN - CNP        Allergies:  Patient has no known allergies.     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   -maternal Hep C   -spontaneous vaginal delivery   Birth Weight: 1.14 kg  Apgars 5, 5, 8   screen - all low risk   Head US - nl   ROP screen - immature zone II   Hep B vaccine given   60 day vaccines given   Echo - mild MR and TR and peripheral pulm stenosis  Home on 4lpm NC   1 dose ceftriaxone at birth for maternal GC infection     Maternal Hx: 44ME [id number], h/o seizure disorder, asthma, gestational HTN,  labor, HepC +, urine culture + E coli, +chlamydia - neg GC at delivery, h/o cocaine and opioid use, no prenatal care, presented in  labor       Development: 12 months:  Walks few steps, Has fine pincer grasp and Says 2-3 words other than ma-ma or da-da    Vaccinations: up to date  Immunization History   Administered Date(s) Administered    DTaP (Infanrix) 2020    DTaP/Hib/IPV (Pentacel) 2021    HIB PRP-T (ActHIB, Hiberix) 2020    Hepatitis B Ped/Adol (Engerix-B, Recombivax HB) 2020, 2020    Influenza, Quadv, IM, PF (6 mo and older Fluzone, Flulaval, Fluarix, and 3 yrs and older Afluria) 2021    Pneumococcal Conjugate 13-valent (Pollyann Reji) 2020, 2021    Polio IPV (IPOL) 2020       Diet:  general and formula (Similac neosure), working on transitioning from similac to milk     Family History:   Family History   Problem Relation Age of Onset    Mental Illness Mother     Drug Abuse Mother Social History:   Currently lives with: Mother, Father and Siblings - foster parents who are looking to adopt, older sister x 2, older brother (also all adopted/foster children - not biologically related to Nayana). Mom is a stay at home mom so Nayana does not go to . His grandma is a nurse. Review of Systems as per HPI, otherwise:  General ROS: positive for fever, negative for appetite change   Ophthalmic ROS: negative for eye discharge or redness   ENT ROS: positive for nasal congestion, rhinorrhea, negative for oral ulcers  Hematological and Lymphatic ROS: negative for - bleeding problems, anemia, lymph node enlargement or bruising  Respiratory ROS: positive for cough, increased work of breathing, tachypnea, and noisy breathing  Cardiovascular ROS: no cyanosis, sweating with feeds  Gastrointestinal ROS: negative for - appetite loss, constipation, diarrhea or nausea/vomiting  Urinary ROS: negative for -hematuria or urinary frequency  Musculoskeletal ROS: negative for  joint swelling or deformities   Neurological ROS: negative for - seizures  Dermatological ROS: negative for - dry skin, rash, jaundice, or lesions    Physical Exam:    Vitals:  Temp: 98.1 °F (36.7 °C) I Temp  Av.5 °F (37.5 °C)  Min: 98.1 °F (36.7 °C)  Max: 103.8 °F (39.9 °C) I Heart Rate: 136 I Pulse  Av.9  Min: 95  Max: 192 I BP: 531/16 I Systolic (78UWW), DLV:662 , Min:127 , WIQ:215   ; Diastolic (81RVJ), AVM:43, Min:58, Max:58   I Resp: (!) 36 I Resp  Av.3  Min: 26  Max: 44 I SpO2: 99 % I SpO2  Av.8 %  Min: 93 %  Max: 100 % I   I Height: 72.5 cm I   I 77 %ile (Z= 0.74) based on WHO (Boys, 0-2 years) head circumference-for-age based on Head Circumference recorded on 2021.  IWt: Weight - Scale: 9.2 kg        GENERAL:  alert, active, interactive and appropriate for age, playful   HEENT:  anterior fontanel soft, and flat,, extra ocular muscles intact, ear canals clear bilaterally, TM pearly and non-erythematous,  oropharynx clear, no oral lesions or erythema, no dental caries   RESPIRATORY:  Increased work of breathing, retractions on arrival, sats dropped to 84% on arrival prior to O2, sats % on 2L O2 NC, some rhonci, croupy cough, noisy breathing, grunting  CARDIOVASCULAR:  regular rate and rhythm, normal S1, S2, no murmur noted, 2+ pulses throughout and capillary Refill less than 2 seconds  ABDOMEN:  soft, non-distended, non-tender, normal active bowel sounds, no masses palpated and no hepatosplenomegaly  GENITALIA/ANUS:  normal male genitalia circumcised and testes descended bilaterally  MUSCULOSKELETAL:  moving all extremities well and symmetrically and back and spine intact, crawling easily   NEUROLOGIC:  normal tone, no focal deficits, good suck reflex, good grasp reflex and good cry  SKIN:  no rashes      DATA:  Lab Review:    CBC with Differential:    Lab Results   Component Value Date    WBC 8.2 09/02/2021    RBC 5.15 09/02/2021    HGB 14.1 09/02/2021    HCT 44.4 09/02/2021     09/02/2021    MCV 86.2 09/02/2021    MCH 27.4 09/02/2021    MCHC 31.8 09/02/2021    RDW 14.0 09/02/2021    NRBC 1 2020    METASPCT 1 2020    LYMPHOPCT 44 09/02/2021    MONOPCT 16 09/02/2021    MYELOPCT 2 2020    BASOPCT 1 09/02/2021    MONOSABS 1.31 09/02/2021    LYMPHSABS 3.61 09/02/2021    EOSABS 0.08 09/02/2021    BASOSABS 0.08 09/02/2021    DIFFTYPE NOT REPORTED 09/02/2021     CMP:    Lab Results   Component Value Date     09/02/2021    K 4.5 09/02/2021     09/02/2021    CO2 15 09/02/2021    BUN 11 09/02/2021    CREATININE <0.40 09/02/2021    GFRAA CANNOT BE CALCULATED 09/02/2021    LABGLOM CANNOT BE CALCULATED 09/02/2021    GLUCOSE 111 09/02/2021    PROT 8.1 09/02/2021    LABALBU 4.9 09/02/2021    CALCIUM 10.0 09/02/2021    BILITOT 0.15 09/02/2021    ALKPHOS 348 09/02/2021    AST 44 09/02/2021    ALT 18 09/02/2021   Results for Ashley Valero (MRN [id number]) as of 9/2/2021 16:58   Ref. Range 4/15/2021 06:10 4/15/2021 06:25 4/15/2021 10:21 4/15/2021 10:27 7/13/2021 11:28 9/2/2021 04:25 9/2/2021 04:28 9/2/2021 04:28 9/2/2021 04:28 9/2/2021 04:28 9/2/2021 05:09   Procalcitonin Latest Ref Range: <0.09 ng/mL      0.68 (H)          Lactic Acid 2.2        RPP: + parainfluenza 2      Radiology Review:    XR CHEST 1 VIEW    Result Date: 9/2/2021  EXAMINATION: ONE XRAY VIEW OF THE CHEST 9/2/2021 5:09 am COMPARISON: Chest radiograph 04/15/2021 HISTORY: ORDERING SYSTEM PROVIDED HISTORY: cough SOB TECHNOLOGIST PROVIDED HISTORY: cough SOB Reason for Exam: SOB and fever Acuity: Acute Type of Exam: Initial FINDINGS: Relatively low lung volumes. Right upper lobe airspace opacity. Streaky bilateral perihilar opacities. No definite findings of pneumothorax or pleural effusion. Normal mediastinal, hilar, and cardiac contours. Normal bowel gas in the included abdomen. Skeletally immature. No evident acute nor healing fracture. 1. Right upper lobe airspace opacity suspicious for pneumonia. 2. Streaky bilateral perihilar opacities potentially due to atelectasis or pneumonia. Assessment:  The patient is a 15 m.o. male with a past medical history of prematurity (27+3 wks GA) + NICU stay, BPD       Diagnosis Date    BPD (bronchopulmonary dysplasia)     pulmonologist: Dr. Bhavana Mcgee, pre-op clearance on file for circumcision, hernia repair    Foster child     to [employer], they are looking to adopt patient    GERD (gastroesophageal reflux disease)     Heart murmur     per foster mom, has followed with pediatric cardiology, and to f/u after [age] year of age, echo on file in epic    Prematurity     Respiratory failure, post-operative ([facility] [address] 04/14/2021    Snores     denies apnea     who is here with increased work of breathing, tachypnea, fever, and cough found to be + parainfluenza 2 - being managed for croup.  CXR revealed perihilar atelectasis and a RUL opacity consistent with atelectasis. On arrival, he had significantly increased work of breathing, tachypnea, and desats. After adding 2L O2 NC and calming down, he was improved and playful although he was still tachypneic, had increased work of breathing, and had noisy breathing. He also has a croupy cough. At this time, we are not planning on adding antibiotics, but should his clinical status worsen or his fever not improve, will consider repeat imaging vs. empiric antibiotic therapy for pneumonia. He currently does not have an IV after multiple attempts but he has good po intake and is tolerating oral fluids. Will continue to monitor. Plan:  -croup pathway   -1 dose oral decadron   -racemic epi prn   -no ivf for now - monitor I+O   -tylenol + motrin for fever   -supplement O2 as needed - on 2L NC now for work of breathing  -chest physiotherapy for right upper lobe atelectasis    -general pediatric diet - similac neosure formula       The plan of care was discussed with the Attending Physician:   [] Dr. Jazmine Nunez  [] Dr. Denisha Reyes  [] Dr. Marcus Harris  [x] Dr. Tk Zamudio  [] Dr. Shakila Machado  [] Attending doctor:     Patient's primary care physician is Fatoumata Meléndez MD      Signed:  Best Chaidez DO  9/2/2021  3:43 PM      Time spent on case: 60min    GC Modifier: I have performed the critical and key portions of the service  and I was directly involved in the management and treatment plan of the  patient. History as documented by resident Dr. Elena Faria on 9/2/2021 reviewed,  caregiver/patient interviewed and patient examined by me. I have seen and examined the patient on 9/2/2021. Agree with above with revisions as marked.     Luke Israel MD

## 2021-09-19 ENCOUNTER — HEALTH MAINTENANCE LETTER (OUTPATIENT)
Age: 61
End: 2021-09-19

## 2021-11-04 ENCOUNTER — TRANSFERRED RECORDS (OUTPATIENT)
Dept: HEALTH INFORMATION MANAGEMENT | Facility: CLINIC | Age: 61
End: 2021-11-04
Payer: COMMERCIAL

## 2021-11-14 ENCOUNTER — HEALTH MAINTENANCE LETTER (OUTPATIENT)
Age: 61
End: 2021-11-14

## 2022-01-27 ENCOUNTER — TRANSFERRED RECORDS (OUTPATIENT)
Dept: HEALTH INFORMATION MANAGEMENT | Facility: CLINIC | Age: 62
End: 2022-01-27
Payer: COMMERCIAL

## 2022-10-10 ENCOUNTER — OFFICE VISIT (OUTPATIENT)
Dept: PEDIATRICS | Facility: CLINIC | Age: 62
End: 2022-10-10
Payer: COMMERCIAL

## 2022-10-10 VITALS
BODY MASS INDEX: 25.44 KG/M2 | OXYGEN SATURATION: 97 % | HEIGHT: 71 IN | HEART RATE: 70 BPM | DIASTOLIC BLOOD PRESSURE: 70 MMHG | RESPIRATION RATE: 16 BRPM | WEIGHT: 181.7 LBS | SYSTOLIC BLOOD PRESSURE: 114 MMHG | TEMPERATURE: 96.6 F

## 2022-10-10 DIAGNOSIS — Z00.00 ENCOUNTER FOR ROUTINE ADULT HEALTH EXAMINATION WITHOUT ABNORMAL FINDINGS: Primary | ICD-10-CM

## 2022-10-10 DIAGNOSIS — I25.10 CORONARY ARTERY CALCIFICATION SEEN ON COMPUTED TOMOGRAPHY: ICD-10-CM

## 2022-10-10 LAB
ALBUMIN SERPL-MCNC: 4 G/DL (ref 3.4–5)
ALP SERPL-CCNC: 54 U/L (ref 40–150)
ALT SERPL W P-5'-P-CCNC: 30 U/L (ref 0–70)
ANION GAP SERPL CALCULATED.3IONS-SCNC: 7 MMOL/L (ref 3–14)
AST SERPL W P-5'-P-CCNC: 25 U/L (ref 0–45)
BASOPHILS # BLD AUTO: 0 10E3/UL (ref 0–0.2)
BASOPHILS NFR BLD AUTO: 0 %
BILIRUB SERPL-MCNC: 0.6 MG/DL (ref 0.2–1.3)
BUN SERPL-MCNC: 14 MG/DL (ref 7–30)
CALCIUM SERPL-MCNC: 9.5 MG/DL (ref 8.5–10.1)
CHLORIDE BLD-SCNC: 106 MMOL/L (ref 94–109)
CHOLEST SERPL-MCNC: 189 MG/DL
CO2 SERPL-SCNC: 25 MMOL/L (ref 20–32)
CREAT SERPL-MCNC: 0.95 MG/DL (ref 0.66–1.25)
EOSINOPHIL # BLD AUTO: 0.4 10E3/UL (ref 0–0.7)
EOSINOPHIL NFR BLD AUTO: 5 %
ERYTHROCYTE [DISTWIDTH] IN BLOOD BY AUTOMATED COUNT: 12.1 % (ref 10–15)
FASTING STATUS PATIENT QL REPORTED: YES
GFR SERPL CREATININE-BSD FRML MDRD: 90 ML/MIN/1.73M2
GLUCOSE BLD-MCNC: 98 MG/DL (ref 70–99)
HCT VFR BLD AUTO: 45.4 % (ref 40–53)
HDLC SERPL-MCNC: 56 MG/DL
HGB BLD-MCNC: 15.6 G/DL (ref 13.3–17.7)
LDLC SERPL CALC-MCNC: 103 MG/DL
LYMPHOCYTES # BLD AUTO: 1.9 10E3/UL (ref 0.8–5.3)
LYMPHOCYTES NFR BLD AUTO: 26 %
MCH RBC QN AUTO: 32.8 PG (ref 26.5–33)
MCHC RBC AUTO-ENTMCNC: 34.4 G/DL (ref 31.5–36.5)
MCV RBC AUTO: 96 FL (ref 78–100)
MONOCYTES # BLD AUTO: 0.8 10E3/UL (ref 0–1.3)
MONOCYTES NFR BLD AUTO: 11 %
NEUTROPHILS # BLD AUTO: 4.2 10E3/UL (ref 1.6–8.3)
NEUTROPHILS NFR BLD AUTO: 58 %
NONHDLC SERPL-MCNC: 133 MG/DL
PLATELET # BLD AUTO: 173 10E3/UL (ref 150–450)
POTASSIUM BLD-SCNC: 4.1 MMOL/L (ref 3.4–5.3)
PROT SERPL-MCNC: 6.9 G/DL (ref 6.8–8.8)
PSA SERPL-MCNC: 0.89 UG/L (ref 0–4)
RBC # BLD AUTO: 4.75 10E6/UL (ref 4.4–5.9)
SODIUM SERPL-SCNC: 138 MMOL/L (ref 133–144)
TRIGL SERPL-MCNC: 150 MG/DL
WBC # BLD AUTO: 7.3 10E3/UL (ref 4–11)

## 2022-10-10 PROCEDURE — 85025 COMPLETE CBC W/AUTO DIFF WBC: CPT | Performed by: INTERNAL MEDICINE

## 2022-10-10 PROCEDURE — 80053 COMPREHEN METABOLIC PANEL: CPT | Performed by: INTERNAL MEDICINE

## 2022-10-10 PROCEDURE — 90471 IMMUNIZATION ADMIN: CPT | Performed by: INTERNAL MEDICINE

## 2022-10-10 PROCEDURE — 90715 TDAP VACCINE 7 YRS/> IM: CPT | Performed by: INTERNAL MEDICINE

## 2022-10-10 PROCEDURE — 80061 LIPID PANEL: CPT | Performed by: INTERNAL MEDICINE

## 2022-10-10 PROCEDURE — 36415 COLL VENOUS BLD VENIPUNCTURE: CPT | Performed by: INTERNAL MEDICINE

## 2022-10-10 PROCEDURE — G0103 PSA SCREENING: HCPCS | Performed by: INTERNAL MEDICINE

## 2022-10-10 PROCEDURE — 99396 PREV VISIT EST AGE 40-64: CPT | Mod: 25 | Performed by: INTERNAL MEDICINE

## 2022-10-10 SDOH — ECONOMIC STABILITY: FOOD INSECURITY: WITHIN THE PAST 12 MONTHS, YOU WORRIED THAT YOUR FOOD WOULD RUN OUT BEFORE YOU GOT MONEY TO BUY MORE.: NEVER TRUE

## 2022-10-10 SDOH — ECONOMIC STABILITY: FOOD INSECURITY: WITHIN THE PAST 12 MONTHS, THE FOOD YOU BOUGHT JUST DIDN'T LAST AND YOU DIDN'T HAVE MONEY TO GET MORE.: NEVER TRUE

## 2022-10-10 SDOH — ECONOMIC STABILITY: INCOME INSECURITY: HOW HARD IS IT FOR YOU TO PAY FOR THE VERY BASICS LIKE FOOD, HOUSING, MEDICAL CARE, AND HEATING?: NOT HARD AT ALL

## 2022-10-10 SDOH — ECONOMIC STABILITY: TRANSPORTATION INSECURITY
IN THE PAST 12 MONTHS, HAS THE LACK OF TRANSPORTATION KEPT YOU FROM MEDICAL APPOINTMENTS OR FROM GETTING MEDICATIONS?: NO

## 2022-10-10 SDOH — HEALTH STABILITY: PHYSICAL HEALTH: ON AVERAGE, HOW MANY DAYS PER WEEK DO YOU ENGAGE IN MODERATE TO STRENUOUS EXERCISE (LIKE A BRISK WALK)?: 3 DAYS

## 2022-10-10 SDOH — ECONOMIC STABILITY: INCOME INSECURITY: IN THE LAST 12 MONTHS, WAS THERE A TIME WHEN YOU WERE NOT ABLE TO PAY THE MORTGAGE OR RENT ON TIME?: NO

## 2022-10-10 SDOH — ECONOMIC STABILITY: TRANSPORTATION INSECURITY
IN THE PAST 12 MONTHS, HAS LACK OF TRANSPORTATION KEPT YOU FROM MEETINGS, WORK, OR FROM GETTING THINGS NEEDED FOR DAILY LIVING?: NO

## 2022-10-10 SDOH — HEALTH STABILITY: PHYSICAL HEALTH: ON AVERAGE, HOW MANY MINUTES DO YOU ENGAGE IN EXERCISE AT THIS LEVEL?: 40 MIN

## 2022-10-10 ASSESSMENT — ENCOUNTER SYMPTOMS
MYALGIAS: 0
PALPITATIONS: 0
ARTHRALGIAS: 1
HEMATURIA: 0
CONSTIPATION: 0
FEVER: 0
CHILLS: 0
WEAKNESS: 0
SORE THROAT: 0
DIARRHEA: 0
HEARTBURN: 0
NERVOUS/ANXIOUS: 0
COUGH: 0
PARESTHESIAS: 0
SHORTNESS OF BREATH: 0
JOINT SWELLING: 0
FREQUENCY: 1
HEMATOCHEZIA: 0
DIZZINESS: 0
NAUSEA: 0
ABDOMINAL PAIN: 0
DYSURIA: 0
EYE PAIN: 0
HEADACHES: 0

## 2022-10-10 ASSESSMENT — SOCIAL DETERMINANTS OF HEALTH (SDOH)
HOW OFTEN DO YOU ATTEND CHURCH OR RELIGIOUS SERVICES?: PATIENT DECLINED
DO YOU BELONG TO ANY CLUBS OR ORGANIZATIONS SUCH AS CHURCH GROUPS UNIONS, FRATERNAL OR ATHLETIC GROUPS, OR SCHOOL GROUPS?: PATIENT DECLINED
HOW OFTEN DO YOU GET TOGETHER WITH FRIENDS OR RELATIVES?: PATIENT DECLINED
IN A TYPICAL WEEK, HOW MANY TIMES DO YOU TALK ON THE PHONE WITH FAMILY, FRIENDS, OR NEIGHBORS?: PATIENT DECLINED

## 2022-10-10 ASSESSMENT — PAIN SCALES - GENERAL: PAINLEVEL: NO PAIN (0)

## 2022-10-10 ASSESSMENT — LIFESTYLE VARIABLES
AUDIT-C TOTAL SCORE: -1
HOW OFTEN DO YOU HAVE SIX OR MORE DRINKS ON ONE OCCASION: NEVER
HOW OFTEN DO YOU HAVE A DRINK CONTAINING ALCOHOL: MONTHLY OR LESS
HOW MANY STANDARD DRINKS CONTAINING ALCOHOL DO YOU HAVE ON A TYPICAL DAY: PATIENT DECLINED
SKIP TO QUESTIONS 9-10: 0

## 2022-10-10 NOTE — PROGRESS NOTES
SUBJECTIVE:   CC: Ladarius is an 62 year old who presents for preventative health visit.         Patient has been advised of split billing requirements and indicates understanding: Yes     Healthy Habits:     Getting at least 3 servings of Calcium per day:  Yes    Bi-annual eye exam:  NO    Dental care twice a year:  Yes    Sleep apnea or symptoms of sleep apnea:  None    Diet:  Regular (no restrictions)    Frequency of exercise:  2-3 days/week    Duration of exercise:  30-45 minutes    Taking medications regularly:  Yes    Medication side effects:  None    PHQ-2 Total Score: 1    Additional concerns today:  Yes      Would like a prostate specific antigen (PSA) and prostate exam.    Talk about last CT scan    Concerned about coloration of skin from being in sun.    The 10-year ASCVD risk score (Jacklyn CAMPBELL, et al., 2019) is: 7.9%    Values used to calculate the score:      Age: 62 years      Sex: Male      Is Non- : No      Diabetic: No      Tobacco smoker: No      Systolic Blood Pressure: 114 mmHg      Is BP treated: No      HDL Cholesterol: 47 mg/dL      Total Cholesterol: 179 mg/dL       Today's PHQ-2 Score:   PHQ-2 (  [employer]) 10/10/2022   Q1: Little interest or pleasure in doing things 1   Q2: Feeling down, depressed or hopeless 0   PHQ-2 Score 1   PHQ-2 Total Score (12-17 Years)- Positive if 3 or more points; Administer PHQ-A if positive -   Q1: Little interest or pleasure in doing things Several days   Q2: Feeling down, depressed or hopeless Not at all   PHQ-2 Score 1       Abuse: Current or Past(Physical, Sexual or Emotional)- No  Do you feel safe in your environment? Yes        Social History     Tobacco Use     Smoking status: Former     Packs/day: 1.00     Years: 25.00     Pack years: 25.00     Types: Cigarettes     Quit date: 10/9/2010     Years since quittin.0     Smokeless tobacco: Never   Substance Use Topics     Alcohol use: Yes     Comment: socially         Alcohol Use  10/10/2022   Prescreen: >3 drinks/day or >7 drinks/week? No   Prescreen: >3 drinks/day or >7 drinks/week? -   AUDIT SCORE  -       Last PSA: No results found for: PSA    Reviewed orders with patient. Reviewed health maintenance and updated orders accordingly - Yes  Lab work is in process  Labs reviewed in EPIC  BP Readings from Last 3 Encounters:   10/10/22 114/70   21 120/77   06/10/21 110/74    Wt Readings from Last 3 Encounters:   10/10/22 82.4 kg (181 lb 11.2 oz)   21 81.4 kg (179 lb 6.4 oz)   06/10/21 79.1 kg (174 lb 6.4 oz)                  Patient Active Problem List   Diagnosis     Lumbar radiculopathy     Renal cyst     Vitamin D deficiency     Alcohol abuse     Impaired fasting glucose     Erectile dysfunction     Coronary artery calcification seen on computed tomography     Past Surgical History:   Procedure Laterality Date     BACK SURGERY      micrdiskectomy done by [facility]      COLONOSCOPY  2011    Procedure:COLONOSCOPY; COLONOSCOPY; Surgeon:JESE FRANKLIN; Location: GI     COLONOSCOPY Left 2021    Procedure: COLONOSCOPY;  Surgeon: Alfredo Munoz MD;  Location:  GI     DISCECTOMY LUMBAR MINIMALLY INVASIVE ONE LEVEL      right L4-5 microdiscectomy     EXCISE MASS LOWER EXTREMITY  2014    Procedure: EXCISE MASS LOWER EXTREMITY;  Right Knee/tibial tubercle Mass Excision ;  Surgeon: Clifford Aguilera MD;  Location:  OR       Social History     Tobacco Use     Smoking status: Former     Packs/day: 1.00     Years: 25.00     Pack years: 25.00     Types: Cigarettes     Quit date: 10/9/2010     Years since quittin.0     Smokeless tobacco: Never   Substance Use Topics     Alcohol use: Yes     Comment: socially     Family History   Adopted: Yes   Problem Relation Age of Onset     Unknown/Adopted Mother      Unknown/Adopted Father      Colon Cancer No family hx of          Current Outpatient Medications   Medication Sig Dispense Refill     clomiPHENE (CLOMID)  50 MG tablet Take 25 mg by mouth daily       L-Carnosine POWD        Multiple Vitamins-Minerals (CENTRUM) TABS Take 1 tablet by mouth daily 30 tablet      Multiple Vitamins-Minerals (ZINC PO)        vitamin D3 (CHOLECALCIFEROL) 50 mcg (2000 units) tablet Take 1 tablet (50 mcg) by mouth daily       NETTLE, URTICA DIOICA, PO        Allergies   Allergen Reactions     Ibuprofen [Aspartame-Ibuprofen] Hives and Swelling     Pcn [Penicillins] Hives and Swelling     No problems with Cephalexin     Aspirin      Eyes swelled     Gadolinium Hives       Reviewed and updated as needed this visit by clinical staff   Tobacco  Allergies    Med Hx  Surg Hx  Fam Hx  Soc Hx        Reviewed and updated as needed this visit by Provider                   Past Medical History:   Diagnosis Date     Closed C7 fracture without spinal cord injury, initial encounter (H) 8/27/2020     Disorder of ligament 8/28/2020      Past Surgical History:   Procedure Laterality Date     BACK SURGERY      micrdiskectomy done by [facility]      COLONOSCOPY  11/18/2011    Procedure:COLONOSCOPY; COLONOSCOPY; Surgeon:JESE FRANKLIN; Location: GI     COLONOSCOPY Left 5/5/2021    Procedure: COLONOSCOPY;  Surgeon: Alfredo Munoz MD;  Location:  GI     DISCECTOMY LUMBAR MINIMALLY INVASIVE ONE LEVEL  2011    right L4-5 microdiscectomy     EXCISE MASS LOWER EXTREMITY  2/12/2014    Procedure: EXCISE MASS LOWER EXTREMITY;  Right Knee/tibial tubercle Mass Excision ;  Surgeon: Clifford Aguilera MD;  Location:  OR       Review of Systems   Constitutional: Negative for chills and fever.   HENT: Negative for congestion, ear pain, hearing loss and sore throat.    Eyes: Negative for pain and visual disturbance.   Respiratory: Negative for cough and shortness of breath.    Cardiovascular: Negative for chest pain, palpitations and peripheral edema.   Gastrointestinal: Negative for abdominal pain, constipation, diarrhea, heartburn, hematochezia and nausea.  "  Genitourinary: Positive for frequency and impotence. Negative for dysuria, genital sores, hematuria, penile discharge and urgency.   Musculoskeletal: Positive for arthralgias. Negative for joint swelling and myalgias.   Skin: Negative for rash.   Neurological: Negative for dizziness, weakness, headaches and paresthesias.   Psychiatric/Behavioral: Negative for mood changes. The patient is not nervous/anxious.      CONSTITUTIONAL: NEGATIVE for fever, chills, change in weight  INTEGUMENTARY/SKIN: NEGATIVE for worrisome rashes, moles or lesions  EYES: NEGATIVE for vision changes or irritation  ENT: NEGATIVE for ear, mouth and throat problems  RESP: NEGATIVE for significant cough or SOB  CV: NEGATIVE for chest pain, palpitations or peripheral edema  GI: NEGATIVE for nausea, abdominal pain, heartburn, or change in bowel habits   male: negative for dysuria, hematuria, decreased urinary stream, erectile dysfunction, urethral discharge  MUSCULOSKELETAL: NEGATIVE for significant arthralgias or myalgia  NEURO: NEGATIVE for weakness, dizziness or paresthesias  PSYCHIATRIC: NEGATIVE for changes in mood or affect    OBJECTIVE:   /70 (BP Location: Right arm, Patient Position: Sitting, Cuff Size: Adult Regular)   Pulse 70   Temp (!) 96.6  F (35.9  C) (Tympanic)   Resp 16   Ht 1.8 m (5' 10.87\")   Wt 82.4 kg (181 lb 11.2 oz)   SpO2 97%   BMI 25.44 kg/m      Physical Exam  GENERAL: healthy, alert and no distress  EYES: Eyes grossly normal to inspection, PERRL and conjunctivae and sclerae normal  HENT: ear canals and TM's normal, nose and mouth without ulcers or lesions  NECK: no adenopathy, no asymmetry, masses, or scars and thyroid normal to palpation  RESP: lungs clear to auscultation - no rales, rhonchi or wheezes  CV: regular rate and rhythm, normal S1 S2, no S3 or S4, no murmur, click or rub, no peripheral edema and peripheral pulses strong  ABDOMEN: soft, nontender, no hepatosplenomegaly, no masses and bowel " "sounds normal   (male): normal male genitalia without lesions or urethral discharge, no hernia  RECTAL: normal sphincter tone, no rectal masses and prostate of normal size for age, smooth, nontender without masses/nodules  MS: no gross musculoskeletal defects noted, no edema  SKIN: no suspicious lesions or rashes  NEURO: Normal strength and tone, mentation intact and speech normal  PSYCH: mentation appears normal, affect normal/bright    Diagnostic Test Results:  Labs reviewed in Epic    ASSESSMENT/PLAN:   (Z00.00) Encounter for routine adult health examination without abnormal findings  (primary encounter diagnosis)  Comment:   Plan: Lipid panel reflex to direct LDL Fasting, PSA,         screen, Comprehensive metabolic panel (BMP +         Alb, Alk Phos, ALT, AST, Total. Bili, TP), CBC         with platelets and differential        Discussed diet, exercise, testicular self exam, blood pressure, cholesterol, and need for cancer surveillance at appropriate ages.     (I25.10) Coronary artery calcification seen on computed tomography  Comment:   Plan: discussed secondary risk factor modification.  He has no symptoms and would like to hold off on statin.  He'll consider baby aspirin.     Patient has been advised of split billing requirements and indicates understanding: Yes    COUNSELING:   Reviewed preventive health counseling, as reflected in patient instructions       Regular exercise       Healthy diet/nutrition       Vision screening       Hearing screening       Colorectal cancer screening       Prostate cancer screening    Estimated body mass index is 25.44 kg/m  as calculated from the following:    Height as of this encounter: 1.8 m (5' 10.87\").    Weight as of this encounter: 82.4 kg (181 lb 11.2 oz).         He reports that he quit smoking about 12 years ago. His smoking use included cigarettes. He has a 25.00 pack-year smoking history. He has never used smokeless tobacco.      Counseling Resources:  ATP IV " Guidelines  Pooled Cohorts Equation Calculator  FRAX Risk Assessment  ICSI Preventive Guidelines  Dietary Guidelines for Americans, 2010  [email or web address]'s MyPlate  ASA Prophylaxis  Lung CA Screening    Stevie Hood MD  [facility]

## 2022-10-10 NOTE — PATIENT INSTRUCTIONS
"Consider a baby aspirin and a low dose of a statin like lipitor 20 mg.    Lab work today:  We can do labs in the exam room today, or you can get them done downstairs in the lab.      If you are going downstairs:  Directions:  As you walk through the first floor, you'll see (on the right) first the pharmacy, then some bathrooms, then the \"Lab and Imaging\" area. Give them your name at the window there and wait for them to call you.     Adacel shot today.       "

## 2022-11-10 ENCOUNTER — TRANSFERRED RECORDS (OUTPATIENT)
Dept: HEALTH INFORMATION MANAGEMENT | Facility: CLINIC | Age: 62
End: 2022-11-10

## 2022-11-21 ENCOUNTER — HEALTH MAINTENANCE LETTER (OUTPATIENT)
Age: 62
End: 2022-11-21

## 2023-02-02 ENCOUNTER — APPOINTMENT (OUTPATIENT)
Dept: SURGERY | Facility: PHYSICIAN GROUP | Age: 63
End: 2023-02-02
Payer: COMMERCIAL

## 2023-02-02 ENCOUNTER — HOSPITAL ENCOUNTER (OUTPATIENT)
Facility: CLINIC | Age: 63
Discharge: HOME | End: 2023-02-02
Attending: EMERGENCY MEDICINE | Admitting: EMERGENCY MEDICINE
Payer: COMMERCIAL

## 2023-02-02 ENCOUNTER — ANESTHESIA (OUTPATIENT)
Dept: SURGERY | Facility: CLINIC | Age: 63
End: 2023-02-02
Payer: COMMERCIAL

## 2023-02-02 ENCOUNTER — ANESTHESIA EVENT (OUTPATIENT)
Dept: SURGERY | Facility: CLINIC | Age: 63
End: 2023-02-02
Payer: COMMERCIAL

## 2023-02-02 ENCOUNTER — APPOINTMENT (OUTPATIENT)
Dept: CT IMAGING | Facility: CLINIC | Age: 63
End: 2023-02-02
Attending: EMERGENCY MEDICINE
Payer: COMMERCIAL

## 2023-02-02 VITALS
HEART RATE: 73 BPM | WEIGHT: 181 LBS | OXYGEN SATURATION: 97 % | BODY MASS INDEX: 25.34 KG/M2 | DIASTOLIC BLOOD PRESSURE: 71 MMHG | RESPIRATION RATE: 6 BRPM | TEMPERATURE: 97.4 F | SYSTOLIC BLOOD PRESSURE: 112 MMHG

## 2023-02-02 DIAGNOSIS — K35.30 ACUTE APPENDICITIS WITH LOCALIZED PERITONITIS, WITHOUT PERFORATION, ABSCESS, OR GANGRENE: ICD-10-CM

## 2023-02-02 LAB
ALBUMIN UR-MCNC: NEGATIVE MG/DL
ANION GAP SERPL CALCULATED.3IONS-SCNC: 13 MMOL/L (ref 7–15)
APPEARANCE UR: CLEAR
BASOPHILS # BLD AUTO: 0 10E3/UL (ref 0–0.2)
BASOPHILS NFR BLD AUTO: 0 %
BILIRUB UR QL STRIP: NEGATIVE
BUN SERPL-MCNC: 8.5 MG/DL (ref 8–23)
CALCIUM SERPL-MCNC: 9.2 MG/DL (ref 8.8–10.2)
CHLORIDE SERPL-SCNC: 104 MMOL/L (ref 98–107)
COLOR UR AUTO: NORMAL
CREAT BLD-MCNC: 1 MG/DL (ref 0.7–1.3)
CREAT SERPL-MCNC: 0.97 MG/DL (ref 0.67–1.17)
DEPRECATED HCO3 PLAS-SCNC: 22 MMOL/L (ref 22–29)
EOSINOPHIL # BLD AUTO: 0.1 10E3/UL (ref 0–0.7)
EOSINOPHIL NFR BLD AUTO: 1 %
ERYTHROCYTE [DISTWIDTH] IN BLOOD BY AUTOMATED COUNT: 12.1 % (ref 10–15)
GFR SERPL CREATININE-BSD FRML MDRD: 88 ML/MIN/1.73M2
GFR SERPL CREATININE-BSD FRML MDRD: >60 ML/MIN/1.73M2
GLUCOSE SERPL-MCNC: 111 MG/DL (ref 70–99)
GLUCOSE UR STRIP-MCNC: NEGATIVE MG/DL
HCT VFR BLD AUTO: 44.3 % (ref 40–53)
HGB BLD-MCNC: 15.3 G/DL (ref 13.3–17.7)
HGB UR QL STRIP: NEGATIVE
IMM GRANULOCYTES # BLD: 0.1 10E3/UL
IMM GRANULOCYTES NFR BLD: 0 %
KETONES UR STRIP-MCNC: NEGATIVE MG/DL
LEUKOCYTE ESTERASE UR QL STRIP: NEGATIVE
LYMPHOCYTES # BLD AUTO: 1.2 10E3/UL (ref 0.8–5.3)
LYMPHOCYTES NFR BLD AUTO: 8 %
MCH RBC QN AUTO: 32.8 PG (ref 26.5–33)
MCHC RBC AUTO-ENTMCNC: 34.5 G/DL (ref 31.5–36.5)
MCV RBC AUTO: 95 FL (ref 78–100)
MONOCYTES # BLD AUTO: 1.4 10E3/UL (ref 0–1.3)
MONOCYTES NFR BLD AUTO: 9 %
NEUTROPHILS # BLD AUTO: 12.2 10E3/UL (ref 1.6–8.3)
NEUTROPHILS NFR BLD AUTO: 82 %
NITRATE UR QL: NEGATIVE
NRBC # BLD AUTO: 0 10E3/UL
NRBC BLD AUTO-RTO: 0 /100
PH UR STRIP: 7 [PH] (ref 5–7)
PLATELET # BLD AUTO: 170 10E3/UL (ref 150–450)
POTASSIUM SERPL-SCNC: 4.2 MMOL/L (ref 3.4–5.3)
RBC # BLD AUTO: 4.67 10E6/UL (ref 4.4–5.9)
RBC URINE: 0 /HPF
SODIUM SERPL-SCNC: 139 MMOL/L (ref 136–145)
SP GR UR STRIP: 1.02 (ref 1–1.03)
UROBILINOGEN UR STRIP-MCNC: NORMAL MG/DL
WBC # BLD AUTO: 15 10E3/UL (ref 4–11)
WBC URINE: <1 /HPF

## 2023-02-02 PROCEDURE — 258N000001 HC RX 258: Performed by: SURGERY

## 2023-02-02 PROCEDURE — 88304 TISSUE EXAM BY PATHOLOGIST: CPT | Mod: 26 | Performed by: PATHOLOGY

## 2023-02-02 PROCEDURE — 258N000003 HC RX IP 258 OP 636: Performed by: NURSE ANESTHETIST, CERTIFIED REGISTERED

## 2023-02-02 PROCEDURE — 82565 ASSAY OF CREATININE: CPT

## 2023-02-02 PROCEDURE — 85004 AUTOMATED DIFF WBC COUNT: CPT | Performed by: EMERGENCY MEDICINE

## 2023-02-02 PROCEDURE — 96365 THER/PROPH/DIAG IV INF INIT: CPT | Mod: 59

## 2023-02-02 PROCEDURE — 96375 TX/PRO/DX INJ NEW DRUG ADDON: CPT

## 2023-02-02 PROCEDURE — 250N000009 HC RX 250: Performed by: NURSE ANESTHETIST, CERTIFIED REGISTERED

## 2023-02-02 PROCEDURE — 250N000011 HC RX IP 250 OP 636: Performed by: EMERGENCY MEDICINE

## 2023-02-02 PROCEDURE — 81001 URINALYSIS AUTO W/SCOPE: CPT | Performed by: EMERGENCY MEDICINE

## 2023-02-02 PROCEDURE — 99285 EMERGENCY DEPT VISIT HI MDM: CPT | Mod: 25

## 2023-02-02 PROCEDURE — 999N000141 HC STATISTIC PRE-PROCEDURE NURSING ASSESSMENT: Performed by: SURGERY

## 2023-02-02 PROCEDURE — 250N000011 HC RX IP 250 OP 636: Performed by: NURSE ANESTHETIST, CERTIFIED REGISTERED

## 2023-02-02 PROCEDURE — 96361 HYDRATE IV INFUSION ADD-ON: CPT | Mod: 59

## 2023-02-02 PROCEDURE — 99204 OFFICE O/P NEW MOD 45 MIN: CPT | Mod: 57 | Performed by: SURGERY

## 2023-02-02 PROCEDURE — 250N000025 HC SEVOFLURANE, PER MIN: Performed by: SURGERY

## 2023-02-02 PROCEDURE — 80048 BASIC METABOLIC PNL TOTAL CA: CPT | Performed by: EMERGENCY MEDICINE

## 2023-02-02 PROCEDURE — 710N000012 HC RECOVERY PHASE 2, PER MINUTE: Performed by: SURGERY

## 2023-02-02 PROCEDURE — 272N000001 HC OR GENERAL SUPPLY STERILE: Performed by: SURGERY

## 2023-02-02 PROCEDURE — 74177 CT ABD & PELVIS W/CONTRAST: CPT

## 2023-02-02 PROCEDURE — 36415 COLL VENOUS BLD VENIPUNCTURE: CPT | Performed by: EMERGENCY MEDICINE

## 2023-02-02 PROCEDURE — 258N000003 HC RX IP 258 OP 636: Performed by: EMERGENCY MEDICINE

## 2023-02-02 PROCEDURE — 710N000009 HC RECOVERY PHASE 1, LEVEL 1, PER MIN: Performed by: SURGERY

## 2023-02-02 PROCEDURE — 360N000076 HC SURGERY LEVEL 3, PER MIN: Performed by: SURGERY

## 2023-02-02 PROCEDURE — 370N000017 HC ANESTHESIA TECHNICAL FEE, PER MIN: Performed by: SURGERY

## 2023-02-02 PROCEDURE — 258N000003 HC RX IP 258 OP 636: Performed by: ANESTHESIOLOGY

## 2023-02-02 PROCEDURE — 250N000009 HC RX 250: Performed by: SURGERY

## 2023-02-02 PROCEDURE — 44970 LAPAROSCOPY APPENDECTOMY: CPT | Performed by: SURGERY

## 2023-02-02 PROCEDURE — 88304 TISSUE EXAM BY PATHOLOGIST: CPT | Mod: TC | Performed by: SURGERY

## 2023-02-02 RX ORDER — FENTANYL CITRATE 50 UG/ML
INJECTION, SOLUTION INTRAMUSCULAR; INTRAVENOUS PRN
Status: DISCONTINUED | OUTPATIENT
Start: 2023-02-02 | End: 2023-02-02

## 2023-02-02 RX ORDER — FENTANYL CITRATE 50 UG/ML
25 INJECTION, SOLUTION INTRAMUSCULAR; INTRAVENOUS EVERY 5 MIN PRN
Status: DISCONTINUED | OUTPATIENT
Start: 2023-02-02 | End: 2023-02-02 | Stop reason: HOSPADM

## 2023-02-02 RX ORDER — NALOXONE HYDROCHLORIDE 0.4 MG/ML
0.2 INJECTION, SOLUTION INTRAMUSCULAR; INTRAVENOUS; SUBCUTANEOUS
Status: DISCONTINUED | OUTPATIENT
Start: 2023-02-02 | End: 2023-02-02 | Stop reason: HOSPADM

## 2023-02-02 RX ORDER — METRONIDAZOLE 500 MG/100ML
500 INJECTION, SOLUTION INTRAVENOUS ONCE
Status: COMPLETED | OUTPATIENT
Start: 2023-02-02 | End: 2023-02-02

## 2023-02-02 RX ORDER — ONDANSETRON 4 MG/1
4 TABLET, ORALLY DISINTEGRATING ORAL EVERY 30 MIN PRN
Status: DISCONTINUED | OUTPATIENT
Start: 2023-02-02 | End: 2023-02-02 | Stop reason: HOSPADM

## 2023-02-02 RX ORDER — OXYCODONE HYDROCHLORIDE 5 MG/1
5-10 TABLET ORAL EVERY 4 HOURS PRN
Qty: 12 TABLET | Refills: 0 | Status: SHIPPED | OUTPATIENT
Start: 2023-02-02 | End: 2023-04-13

## 2023-02-02 RX ORDER — OXYCODONE HYDROCHLORIDE 5 MG/1
10 TABLET ORAL EVERY 4 HOURS PRN
Status: DISCONTINUED | OUTPATIENT
Start: 2023-02-02 | End: 2023-02-02 | Stop reason: HOSPADM

## 2023-02-02 RX ORDER — ACETAMINOPHEN 500 MG
1000 TABLET ORAL EVERY 6 HOURS PRN
COMMUNITY
Start: 2023-02-02 | End: 2023-11-07

## 2023-02-02 RX ORDER — ONDANSETRON 2 MG/ML
4 INJECTION INTRAMUSCULAR; INTRAVENOUS ONCE
Status: COMPLETED | OUTPATIENT
Start: 2023-02-02 | End: 2023-02-02

## 2023-02-02 RX ORDER — ONDANSETRON 2 MG/ML
4 INJECTION INTRAMUSCULAR; INTRAVENOUS EVERY 30 MIN PRN
Status: DISCONTINUED | OUTPATIENT
Start: 2023-02-02 | End: 2023-02-02 | Stop reason: HOSPADM

## 2023-02-02 RX ORDER — NALOXONE HYDROCHLORIDE 0.4 MG/ML
0.4 INJECTION, SOLUTION INTRAMUSCULAR; INTRAVENOUS; SUBCUTANEOUS
Status: DISCONTINUED | OUTPATIENT
Start: 2023-02-02 | End: 2023-02-02 | Stop reason: HOSPADM

## 2023-02-02 RX ORDER — HYDROMORPHONE HYDROCHLORIDE 1 MG/ML
0.5 INJECTION, SOLUTION INTRAMUSCULAR; INTRAVENOUS; SUBCUTANEOUS
Status: DISCONTINUED | OUTPATIENT
Start: 2023-02-02 | End: 2023-02-02 | Stop reason: HOSPADM

## 2023-02-02 RX ORDER — HYDRALAZINE HYDROCHLORIDE 20 MG/ML
2.5-5 INJECTION INTRAMUSCULAR; INTRAVENOUS EVERY 10 MIN PRN
Status: DISCONTINUED | OUTPATIENT
Start: 2023-02-02 | End: 2023-02-02 | Stop reason: HOSPADM

## 2023-02-02 RX ORDER — ACETAMINOPHEN 325 MG/1
650 TABLET ORAL
Status: DISCONTINUED | OUTPATIENT
Start: 2023-02-02 | End: 2023-02-02 | Stop reason: HOSPADM

## 2023-02-02 RX ORDER — GLYCOPYRROLATE 0.2 MG/ML
INJECTION, SOLUTION INTRAMUSCULAR; INTRAVENOUS PRN
Status: DISCONTINUED | OUTPATIENT
Start: 2023-02-02 | End: 2023-02-02

## 2023-02-02 RX ORDER — METOPROLOL TARTRATE 1 MG/ML
1-2 INJECTION, SOLUTION INTRAVENOUS EVERY 5 MIN PRN
Status: DISCONTINUED | OUTPATIENT
Start: 2023-02-02 | End: 2023-02-02 | Stop reason: HOSPADM

## 2023-02-02 RX ORDER — OXYCODONE HYDROCHLORIDE 5 MG/1
5 TABLET ORAL EVERY 4 HOURS PRN
Status: DISCONTINUED | OUTPATIENT
Start: 2023-02-02 | End: 2023-02-02 | Stop reason: HOSPADM

## 2023-02-02 RX ORDER — OXYCODONE HYDROCHLORIDE 5 MG/1
5 TABLET ORAL
Status: DISCONTINUED | OUTPATIENT
Start: 2023-02-02 | End: 2023-02-02 | Stop reason: HOSPADM

## 2023-02-02 RX ORDER — SODIUM CHLORIDE, SODIUM LACTATE, POTASSIUM CHLORIDE, CALCIUM CHLORIDE 600; 310; 30; 20 MG/100ML; MG/100ML; MG/100ML; MG/100ML
INJECTION, SOLUTION INTRAVENOUS CONTINUOUS PRN
Status: DISCONTINUED | OUTPATIENT
Start: 2023-02-02 | End: 2023-02-02

## 2023-02-02 RX ORDER — PROPOFOL 10 MG/ML
INJECTION, EMULSION INTRAVENOUS PRN
Status: DISCONTINUED | OUTPATIENT
Start: 2023-02-02 | End: 2023-02-02

## 2023-02-02 RX ORDER — IOPAMIDOL 755 MG/ML
500 INJECTION, SOLUTION INTRAVASCULAR ONCE
Status: COMPLETED | OUTPATIENT
Start: 2023-02-02 | End: 2023-02-02

## 2023-02-02 RX ORDER — HYDROMORPHONE HCL IN WATER/PF 6 MG/30 ML
0.4 PATIENT CONTROLLED ANALGESIA SYRINGE INTRAVENOUS EVERY 5 MIN PRN
Status: DISCONTINUED | OUTPATIENT
Start: 2023-02-02 | End: 2023-02-02 | Stop reason: HOSPADM

## 2023-02-02 RX ORDER — CEFTRIAXONE 1 G/1
1 INJECTION, POWDER, FOR SOLUTION INTRAMUSCULAR; INTRAVENOUS ONCE
Status: COMPLETED | OUTPATIENT
Start: 2023-02-02 | End: 2023-02-02

## 2023-02-02 RX ORDER — BUPIVACAINE HYDROCHLORIDE 5 MG/ML
INJECTION, SOLUTION EPIDURAL; INTRACAUDAL PRN
Status: DISCONTINUED | OUTPATIENT
Start: 2023-02-02 | End: 2023-02-02 | Stop reason: HOSPADM

## 2023-02-02 RX ORDER — SODIUM CHLORIDE, SODIUM LACTATE, POTASSIUM CHLORIDE, CALCIUM CHLORIDE 600; 310; 30; 20 MG/100ML; MG/100ML; MG/100ML; MG/100ML
INJECTION, SOLUTION INTRAVENOUS CONTINUOUS
Status: DISCONTINUED | OUTPATIENT
Start: 2023-02-02 | End: 2023-02-02 | Stop reason: HOSPADM

## 2023-02-02 RX ORDER — NEOSTIGMINE METHYLSULFATE 1 MG/ML
VIAL (ML) INJECTION PRN
Status: DISCONTINUED | OUTPATIENT
Start: 2023-02-02 | End: 2023-02-02

## 2023-02-02 RX ORDER — ACETAMINOPHEN 325 MG/1
975 TABLET ORAL ONCE
Status: DISCONTINUED | OUTPATIENT
Start: 2023-02-02 | End: 2023-02-02 | Stop reason: HOSPADM

## 2023-02-02 RX ORDER — FENTANYL CITRATE 50 UG/ML
50 INJECTION, SOLUTION INTRAMUSCULAR; INTRAVENOUS
Status: DISCONTINUED | OUTPATIENT
Start: 2023-02-02 | End: 2023-02-02 | Stop reason: HOSPADM

## 2023-02-02 RX ORDER — LIDOCAINE HYDROCHLORIDE 10 MG/ML
INJECTION, SOLUTION INFILTRATION; PERINEURAL PRN
Status: DISCONTINUED | OUTPATIENT
Start: 2023-02-02 | End: 2023-02-02

## 2023-02-02 RX ORDER — ONDANSETRON 2 MG/ML
INJECTION INTRAMUSCULAR; INTRAVENOUS PRN
Status: DISCONTINUED | OUTPATIENT
Start: 2023-02-02 | End: 2023-02-02

## 2023-02-02 RX ORDER — FENTANYL CITRATE 50 UG/ML
50 INJECTION, SOLUTION INTRAMUSCULAR; INTRAVENOUS EVERY 5 MIN PRN
Status: DISCONTINUED | OUTPATIENT
Start: 2023-02-02 | End: 2023-02-02 | Stop reason: HOSPADM

## 2023-02-02 RX ORDER — HYDROMORPHONE HCL IN WATER/PF 6 MG/30 ML
0.2 PATIENT CONTROLLED ANALGESIA SYRINGE INTRAVENOUS EVERY 5 MIN PRN
Status: DISCONTINUED | OUTPATIENT
Start: 2023-02-02 | End: 2023-02-02 | Stop reason: HOSPADM

## 2023-02-02 RX ORDER — DEXAMETHASONE SODIUM PHOSPHATE 4 MG/ML
INJECTION, SOLUTION INTRA-ARTICULAR; INTRALESIONAL; INTRAMUSCULAR; INTRAVENOUS; SOFT TISSUE PRN
Status: DISCONTINUED | OUTPATIENT
Start: 2023-02-02 | End: 2023-02-02

## 2023-02-02 RX ADMIN — CEFTRIAXONE 1 G: 1 INJECTION, POWDER, FOR SOLUTION INTRAMUSCULAR; INTRAVENOUS at 10:58

## 2023-02-02 RX ADMIN — ROCURONIUM BROMIDE 50 MG: 50 INJECTION, SOLUTION INTRAVENOUS at 12:51

## 2023-02-02 RX ADMIN — IOPAMIDOL 99 ML: 755 INJECTION, SOLUTION INTRAVENOUS at 10:07

## 2023-02-02 RX ADMIN — METRONIDAZOLE 500 MG: 500 INJECTION, SOLUTION INTRAVENOUS at 11:48

## 2023-02-02 RX ADMIN — ONDANSETRON 4 MG: 2 INJECTION INTRAMUSCULAR; INTRAVENOUS at 13:29

## 2023-02-02 RX ADMIN — PROPOFOL 200 MG: 10 INJECTION, EMULSION INTRAVENOUS at 12:51

## 2023-02-02 RX ADMIN — FENTANYL CITRATE 100 MCG: 50 INJECTION, SOLUTION INTRAMUSCULAR; INTRAVENOUS at 13:15

## 2023-02-02 RX ADMIN — ONDANSETRON 4 MG: 2 INJECTION INTRAMUSCULAR; INTRAVENOUS at 08:15

## 2023-02-02 RX ADMIN — SODIUM CHLORIDE 1000 ML: 9 INJECTION, SOLUTION INTRAVENOUS at 09:15

## 2023-02-02 RX ADMIN — SODIUM CHLORIDE, POTASSIUM CHLORIDE, SODIUM LACTATE AND CALCIUM CHLORIDE: 600; 310; 30; 20 INJECTION, SOLUTION INTRAVENOUS at 12:42

## 2023-02-02 RX ADMIN — MIDAZOLAM 2 MG: 1 INJECTION INTRAMUSCULAR; INTRAVENOUS at 12:43

## 2023-02-02 RX ADMIN — DEXAMETHASONE SODIUM PHOSPHATE 8 MG: 4 INJECTION, SOLUTION INTRA-ARTICULAR; INTRALESIONAL; INTRAMUSCULAR; INTRAVENOUS; SOFT TISSUE at 12:51

## 2023-02-02 RX ADMIN — SODIUM CHLORIDE, POTASSIUM CHLORIDE, SODIUM LACTATE AND CALCIUM CHLORIDE: 600; 310; 30; 20 INJECTION, SOLUTION INTRAVENOUS at 12:31

## 2023-02-02 RX ADMIN — GLYCOPYRROLATE 0.6 MG: 0.2 INJECTION, SOLUTION INTRAMUSCULAR; INTRAVENOUS at 13:35

## 2023-02-02 RX ADMIN — FENTANYL CITRATE 100 MCG: 50 INJECTION, SOLUTION INTRAMUSCULAR; INTRAVENOUS at 12:51

## 2023-02-02 RX ADMIN — NEOSTIGMINE METHYLSULFATE 4 MG: 1 INJECTION, SOLUTION INTRAVENOUS at 13:35

## 2023-02-02 RX ADMIN — LIDOCAINE HYDROCHLORIDE 50 MG: 10 INJECTION, SOLUTION INFILTRATION; PERINEURAL at 12:51

## 2023-02-02 ASSESSMENT — ENCOUNTER SYMPTOMS
CONSTIPATION: 0
ABDOMINAL PAIN: 1
HEMATURIA: 0
DIARRHEA: 0
DYSURIA: 0

## 2023-02-02 ASSESSMENT — ACTIVITIES OF DAILY LIVING (ADL)
ADLS_ACUITY_SCORE: 35
ADLS_ACUITY_SCORE: 33

## 2023-02-02 NOTE — CONSULTS
[facility]  General Surgery Consult    Ladarius Gaytan MRN# [id number]   Age: 62 year old YOB: 1960     HPI:  History is obtained from the patient. Ladarius Gaytan is a 62 year old year old patient who has been experiencing acute periumbilical and RLQ pain for the past 1 day associated with nausea, vomiting and anorexia.  Negative for associated fever and chills. No similar episodes of pain in the past.     Review Of Systems:  The 10 point review of systems is negative other than noted in the HPI.    PMH:  Past Medical History:   Diagnosis Date     Closed C7 fracture without spinal cord injury, initial encounter (H) 8/27/2020     Disorder of ligament 8/28/2020       PSH:  Past Surgical History:   Procedure Laterality Date     BACK SURGERY      micrdiskectomy done by [facility]      COLONOSCOPY  11/18/2011    Procedure:COLONOSCOPY; COLONOSCOPY; Surgeon:JESE FRANKLIN; Location: GI     COLONOSCOPY Left 5/5/2021    Procedure: COLONOSCOPY;  Surgeon: Alfredo Munoz MD;  Location:  GI     DISCECTOMY LUMBAR MINIMALLY INVASIVE ONE LEVEL  2011    right L4-5 microdiscectomy     EXCISE MASS LOWER EXTREMITY  2/12/2014    Procedure: EXCISE MASS LOWER EXTREMITY;  Right Knee/tibial tubercle Mass Excision ;  Surgeon: Clifford Aguilera MD;  Location:  OR       Allergies:  Allergies   Allergen Reactions     Ibuprofen [Aspartame-Ibuprofen] Hives and Swelling     Pcn [Penicillins] Hives and Swelling     No problems with Cephalexin     Aspirin      Eyes swelled     Gadolinium Hives       Home Medications:  Current Outpatient Medications   Medication Sig Dispense Refill     clomiPHENE (CLOMID) 50 MG tablet Take 25 mg by mouth daily       L-Carnosine POWD        Multiple Vitamins-Minerals (CENTRUM) TABS Take 1 tablet by mouth daily 30 tablet      Multiple Vitamins-Minerals (ZINC PO)        NETTLE, URTICA DIOICA, PO        vitamin D3 (CHOLECALCIFEROL) 50 mcg (2000 units) tablet Take 1 tablet (50  mcg) by mouth daily         Social History:  Social History     Tobacco Use     Smoking status: Former     Packs/day: 1.00     Years: 25.00     Pack years: 25.00     Types: Cigarettes     Quit date: 10/9/2010     Years since quittin.3     Smokeless tobacco: Never   Vaping Use     Vaping Use: Never used   Substance Use Topics     Alcohol use: Yes     Comment: socially     Drug use: No       Family History:  No family history chronic diarrhea, inflammatory bowel disease or colon cancer.  No bleeding disorders, clotting disorders, or problems with anesthesia.    Physical Exam:  /81   Pulse 78   Temp 97.3  F (36.3  C) (Temporal)   Resp 18   SpO2 99%   General appearance: Resting Comfortably in bed, no apparent distress  Lungs: Breathing comfortably on room air  Heart: Regular rate and rhythm'  Abdomen: Soft, nondistended, tender to palpation in RLQ with guarding, no palpable masses or hernias  Extremities: Without clubbing, cyanosis, edema  Neurologic: Grossly intact times four extremities, alert and oriented times three  Psychiatric: Mood and affect are appropriate  Skin: Without lesions or rashes      Labs Reviewed:  Lab Results   Component Value Date    WBC 15.0 2023    WBC 7.5 2019     Lab Results   Component Value Date    HGB 15.3 2023    HGB 16.4 2019     Lab Results   Component Value Date     2023     2019     Last Basic Metabolic Panel:  Lab Results   Component Value Date     2023     2019      Lab Results   Component Value Date    POTASSIUM 4.2 2023    POTASSIUM 4.1 10/10/2022    POTASSIUM 4.4 2021     Lab Results   Component Value Date    CHLORIDE 104 2023    CHLORIDE 106 10/10/2022    CHLORIDE 105 2019     Lab Results   Component Value Date    JOHANN 9.2 2023    JOHANN 9.9 2019     Lab Results   Component Value Date    CO2 22 2023    CO2 25 10/10/2022    CO2 33 2019     Lab  Results   Component Value Date    BUN 8.5 02/02/2023    BUN 14 10/10/2022    BUN 13 06/28/2019     Lab Results   Component Value Date    CR 0.97 02/02/2023    CR 1.0 02/02/2023    CR 1.22 02/03/2021     Lab Results   Component Value Date     02/02/2023    GLC 98 10/10/2022    GLC 96 02/03/2021       Radiology:  All imaging studies reviewed by me.    Results for orders placed or performed during the hospital encounter of 02/02/23   Abd/pelvis CT,  IV  contrast only TRAUMA / AAA    Narrative    CT ABDOMEN AND PELVIS WITH CONTRAST 2/2/2023 10:26 AM    CLINICAL HISTORY: Right lower quadrant abdominal pain.    TECHNIQUE: CT scan of the abdomen and pelvis was performed following  injection of IV contrast. Multiplanar reformats were obtained. Dose  reduction techniques were used.  CONTRAST: 99mL Isovue-370    COMPARISON: CT chest abdomen and pelvis dated 7/1/2021.    FINDINGS:   LOWER CHEST: Normal.    HEPATOBILIARY: Normal.    PANCREAS: Normal.    SPLEEN: Normal.    ADRENAL GLANDS: Normal.    KIDNEYS/BLADDER: No hydronephrosis. Previously noted nonobstructing  stone at the left kidney is limited in view. There are bilateral small  renal cysts with the simple lesions not requiring specific imaging  follow-up. There is a stable increased lesion measuring 0.9 cm at the  left kidney series 2 image 65. Additional previously noted hyperdense  renal lesions are limited in view, but may be obscured by the presence  of contrast. Bladder is unremarkable.    BOWEL: Enlarged and inflamed appendix extending medial to the cecum at  the right upper pelvis level measures 1.2 cm series 2 image 164.  Appendicolith is present. Appendix filled with fluid. Adjacent mild  edema. No bowel obstruction. No abscess is seen.    PELVIC ORGANS: Normal.    ADDITIONAL FINDINGS: Mild vascular calcifications. No suspicious lymph  nodes.    MUSCULOSKELETAL: Spine degenerative changes.      Impression    IMPRESSION:   1.  Acute appendicitis.  Enlarged and inflamed fluid-filled appendix is  1.2 cm extending medial to the cecum. No abscess.  2.  Hyperdense left renal lesion is stable, but indeterminant. The  additional lesion described on 7/1/2021 is difficult to visualize.  Regular and correlation with nonurgent renal MRI.         ASSESSMENT/PLAN:  The patient's history, physical exam, laboratory and imaging studies are suspicious for acute appendicitis.  I have offered the patient a laparoscopic appendectomy.      We have had a detailed discussion of nature of appendicitis, the procedure, its risks, benefits, alternatives, recovery, postop limitations, anesthesia, bleeding, blood transfusion,  DVT, PE, postoperative infections, injury to adjacent organs and structures, open conversion, bowel resection, prolonged convalescence in the event of gangrene or perforation of the appendix, abdominal wall hernia, intraabdominal adhesions which can lead to bowel obstruction.  We have discussed interventions and treatment for these complications.  The patient understands the possibility of a diagnosis other than appendicitis.  All questions have been answered to the best of my ability.      He elects to proceed.      Pre-operative antibiotics have been given.     Renetta Dillon MD    Time spent with the patient, reviewing the EMR, reviewing laboratory and imaging studies, more than 50% of which was counseling and coordinating care:  30 minutes.

## 2023-02-02 NOTE — DISCHARGE INSTRUCTIONS
FREQUENTLY ASKED QUESTIONS AFTER SURGERY  MALIKA Mcfarland, TAWANDA Galindo C. Pratt & LAUREN Wilson      Q:  How should my incision look?    A:  Normally your incision will appear slightly swollen with light redness directly along the incision itself as it heals.  It may feel like a bump or ridge as the healing/scarring happens, and over time (3-4 months) this bump or ridge feeling should slowly go away.  In general, clear or pink watery drainage can be normal at first as your incision heals, but should decrease over time.    Q:  How do I know if my incision is infected?  A:  Look at your incision for signs of infection, like redness around the incision spreading to surrounding skin, or drainage of cloudy or foul-smelling drainage.  If you feel warm, check your temperature to see if you are running a fever.    **If any of these things occur, please notify the nurse at our office.  We may need you to come into the office for an incision check.      Q:  How do I take care of my incision?  A:  If you have a dressing in place - Starting the day after surgery, replace the dressing 1-2 times a day until there is no further drainage from the incision.  At that time, a dressing is no longer needed.  Try to minimize tape on the skin if irritation is occurring at the tape sites.  If you have significant irritation from tape on the skin, please call the office to discuss other method of dressing your incision.    Small pieces of tape called  steri-strips  may be present directly overlying your incision; these may be removed 10 days after surgery unless otherwise specified by your surgeon.  If these tapes start to loosen at the ends, you may trim them back until they fall off or are removed.    A:  If you had  Dermabond  tissue glue used as a dressing (this causes your incision to look shiny with a clear covering over it) - This type of dressing wears off with time and does not require more dressings over  the top unless it is draining around the glue as it wears off.  Do not apply ointments or lotions over the incisions until the glue has completely worn off.    Q:  There is a piece of tape or a sticky  lead  still on my skin.  Can I remove this?  A:  Sometimes the sticky  leads  used for monitoring during surgery or for evaluation in the emergency department are not all removed while you are in the hospital.  These sometimes have a tab or metal dot on them.  You can easily remove these on your own, like taking off a band-aid.  If there is a gel substance under the  lead , simply wipe/clean it off with a washcloth or paper towel.      Q:  What can I do to minimize constipation (very hard stools, or lack of stools)?  A:  Stay well hydrated.  Increase your dietary fiber intake or take a fiber supplement -with plenty of water.  Walk around frequently.  You may consider an over-the-counter stool-softener.  Your Pharmacist can assist you with choosing one that is stocked at your pharmacy.  Constipation is also one of the most common side effects of pain medication.  If you are using pain medication, be pro-active and try to PREVENT problems with constipation by taking the steps above BEFORE constipation becomes a problem.    Q:  What do I do if I need more pain medications?  A:  Call the office to receive refills.  Be aware that certain pain meds cannot be called into a pharmacy and actually require a paper prescription.  A change may be made in your pain med as you progress thru your recovery period or if you have side effects to certain meds.    --Pain meds are NOT refilled after 5pm on weekdays, and NOT AT ALL on the weekends, so please look ahead to prevent problems.                  Q:  Why am I having a hard time sleeping now that I am at home?  A:  Many medications you receive while you are in the hospital can impact your sleep for a number of days after your surgery/hospitalization.  Decreased level of activity  and naps during the day may also make sleeping at night difficult.  Try to minimize day-time naps, and get up frequently during the day to walk around your home during your recovery time.  Sleep aides may be of some help, but are not recommended for long-term use.      Q:  I am having some back discomfort.  What should I do?  A:  This may be related to certain positioning that was required for your surgery, extended periods of time in bed, or other changes in your overall activity level.  You may try ice, heat, acetaminophen, or ibuprofen to treat this temporarily.  Note that many pain medications have acetaminophen in them and would state this on the prescription bottle.  Be sure not to exceed the maximum of 4000mg per day of acetaminophen.     **If the pain you are having does not resolve, is severe, or is a flare of back pain you have had on other occasions prior to surgery, please contact your primary physician for further recommendations or for an appointment to be examined at their office.    Q:  Why am I having headaches?  A:  Headaches can be caused by many things:  caffeine withdrawal, use of pain meds, dehydration, high blood pressure, lack of sleep, over-activity/exhaustion, flare-up of usual migraine headaches.  If you feel this is related to muscle tension (a band-like feeling around the head, or a pressure at the low-back of the head) you may try ice or heat to this area.  You may need to drink more fluids (try electrolyte drink like Gatorade), rest, or take your usual migraine medications.   **If your headaches do not resolve, worsen, are accompanied by other symptoms, or if your blood pressure is high, please call your primary physician for recommendation and/or examination.    Q:  I am unable to urinate.  What do I do?  A:  A small percentage of people can have difficulty urinating initially after surgery.  This includes being able to urinate only a very small amount at a time and feeling discomfort  or pressure in the very low abdomen.  This is called  urinary retention , and is actually an urgent situation.  Proceed to your nearest Emergency department for evaluation (not an Urgent Care Center).  Sometimes the bladder does not work correctly after certain medications you receive during surgery, or related to certain procedures.  You may need to have a catheter placed until your bladder recovers.  When planning to go to an Emergency department, it may help to call the ER to let them know you are coming in for this problem after a surgery.  This may help you get in quicker to be evaluated.  **If you have symptoms of a urinary tract infection, please contact your primary physician for the proper evaluation and treatment.              If you have other questions, please call the office Monday thru Friday between 8am and 5pm to discuss with the nurse or physician assistant.  #[phone]    There is a surgeon ON CALL on weekday evenings and over the weekend in case of urgent need only, and may be contacted at the same number.    If you are having an emergency, call 911 or proceed to your nearest emergency department.    GENERAL ANESTHESIA OR SEDATION ADULT DISCHARGE INSTRUCTIONS   SPECIAL PRECAUTIONS FOR 24 HOURS AFTER SURGERY    IT IS NOT UNUSUAL TO FEEL LIGHT-HEADED OR FAINT, UP TO 24 HOURS AFTER SURGERY OR WHILE TAKING PAIN MEDICATION.  IF YOU HAVE THESE SYMPTOMS; SIT FOR A FEW MINUTES BEFORE STANDING AND HAVE SOMEONE ASSIST YOU WHEN YOU GET UP TO WALK OR USE THE BATHROOM.    YOU SHOULD REST AND RELAX FOR THE NEXT 24 HOURS AND YOU MUST MAKE ARRANGEMENTS TO HAVE SOMEONE STAY WITH YOU FOR AT LEAST 24 HOURS AFTER YOUR DISCHARGE.  AVOID HAZARDOUS AND STRENUOUS ACTIVITIES.  DO NOT MAKE IMPORTANT DECISIONS FOR 24 HOURS.    DO NOT DRIVE ANY VEHICLE OR OPERATE MECHANICAL EQUIPMENT FOR 24 HOURS FOLLOWING THE END OF YOUR SURGERY.  EVEN THOUGH YOU MAY FEEL NORMAL, YOUR REACTIONS MAY BE AFFECTED BY THE MEDICATION YOU  HAVE RECEIVED.    DO NOT DRINK ALCOHOLIC BEVERAGES FOR 24 HOURS FOLLOWING YOUR SURGERY.    DRINK CLEAR LIQUIDS (APPLE JUICE, GINGER ALE, 7-UP, BROTH, ETC.).  PROGRESS TO YOUR REGULAR DIET AS YOU FEEL ABLE.    YOU MAY HAVE A DRY MOUTH, A SORE THROAT, MUSCLES ACHES OR TROUBLE SLEEPING.  THESE SHOULD GO AWAY AFTER 24 HOURS.    CALL YOUR DOCTOR FOR ANY OF THE FOLLOWING:  SIGNS OF INFECTION (FEVER, GROWING TENDERNESS AT THE SURGERY SITE, A LARGE AMOUNT OF DRAINAGE OR BLEEDING, SEVERE PAIN, FOUL-SMELLING DRAINAGE, REDNESS OR SWELLING.    IT HAS BEEN OVER 8 TO 10 HOURS SINCE SURGERY AND YOU ARE STILL NOT ABLE TO URINATE (PASS WATER).

## 2023-02-02 NOTE — ANESTHESIA CARE TRANSFER NOTE
Patient: Ladarius Gaytan    Procedure: Procedure(s):  LAPAROSCOPIC APPENDECTOMY       Diagnosis: Acute appendicitis with localized peritonitis, without perforation, abscess, or gangrene [K35.30]  Diagnosis Additional Information: No value filed.    Anesthesia Type:   General     Note:    Oropharynx: oropharynx clear of all foreign objects and spontaneously breathing  Level of Consciousness: awake  Oxygen Supplementation: face mask    Independent Airway: airway patency satisfactory and stable  Dentition: dentition unchanged  Vital Signs Stable: post-procedure vital signs reviewed and stable  Report to RN Given: handoff report given  Patient transferred to: PACU    Handoff Report: Identifed the Patient, Identified the Reponsible Provider, Reviewed the pertinent medical history, Discussed the surgical course, Reviewed Intra-OP anesthesia mangement and issues during anesthesia, Set expectations for post-procedure period and Allowed opportunity for questions and acknowledgement of understanding      Vitals:  Vitals Value Taken Time   /70 02/02/23 1344   Temp     Pulse 88 02/02/23 1348   Resp 11 02/02/23 1348   SpO2 100 % 02/02/23 1348   Vitals shown include unvalidated device data.    Electronically Signed By: ROSE Diamond CRNA  February 2, 2023  1:49 PM

## 2023-02-02 NOTE — LETTER
To Whom It May Concern:    Ladarius Gaytan was a patient at [facility] on February 2, 2023 and underwent a surgical procedure. He will require time for physical recovery. Please excuse Ladarius Gaytan from work from 2/2/23 until 2/6/23. After this time, he may return to work, but should refrain from lifting, pushing, or pulling greater than 20 pounds until 2/16/23.     Thank you for your understanding.     Sincerely,      Renetta Dillon MD

## 2023-02-02 NOTE — ANESTHESIA POSTPROCEDURE EVALUATION
Patient: Ladarius Gaytan    Procedure: Procedure(s):  LAPAROSCOPIC APPENDECTOMY       Anesthesia Type:  General    Note:  Disposition: Outpatient   Postop Pain Control: Uneventful            Sign Out: Well controlled pain   PONV: No   Neuro/Psych: Uneventful            Sign Out: Acceptable/Baseline neuro status   Airway/Respiratory: Uneventful            Sign Out: Acceptable/Baseline resp. status   CV/Hemodynamics: Uneventful            Sign Out: Acceptable CV status; No obvious hypovolemia; No obvious fluid overload   Other NRE: NONE   DID A NON-ROUTINE EVENT OCCUR? No           Last vitals:  Vitals Value Taken Time   /73 02/02/23 1430   Temp 98.8  F (37.1  C) 02/02/23 1425   Pulse 72 02/02/23 1437   Resp 15 02/02/23 1437   SpO2 97 % 02/02/23 1437   Vitals shown include unvalidated device data.    Electronically Signed By: Satnider Chatterjee MD  February 2, 2023  4:00 PM

## 2023-02-02 NOTE — ED TRIAGE NOTES
Pt arrives with c/o RLQ pain that started yesterday. Pt endorses some nausea, denies vomiting. Pt denies diarrhea.      Triage Assessment     Row Name 02/02/23 0887       Triage Assessment (Adult)    Airway WDL WDL       Respiratory WDL    Respiratory WDL WDL       Skin Circulation/Temperature WDL    Skin Circulation/Temperature WDL WDL       Cardiac WDL    Cardiac WDL WDL       Peripheral/Neurovascular WDL    Peripheral Neurovascular WDL WDL       Cognitive/Neuro/Behavioral WDL    Cognitive/Neuro/Behavioral WDL WDL

## 2023-02-02 NOTE — ANESTHESIA PREPROCEDURE EVALUATION
Anesthesia Pre-Procedure Evaluation    Patient: Ladarius Gaytan   MRN: [id number] : 1960        Procedure : Procedure(s):  APPENDECTOMY, LAPAROSCOPIC          Past Medical History:   Diagnosis Date     Closed C7 fracture without spinal cord injury, initial encounter (H) 2020     Disorder of ligament 2020      Past Surgical History:   Procedure Laterality Date     BACK SURGERY      micrdiskectomy done by [facility]      COLONOSCOPY  2011    Procedure:COLONOSCOPY; COLONOSCOPY; Surgeon:JESE FRANKLIN; Location:[facility]     COLONOSCOPY Left 2021    Procedure: COLONOSCOPY;  Surgeon: Alfredo Munoz MD;  Location: [facility]     DISCECTOMY LUMBAR MINIMALLY INVASIVE ONE LEVEL      right L4-5 microdiscectomy     EXCISE MASS LOWER EXTREMITY  2014    Procedure: EXCISE MASS LOWER EXTREMITY;  Right Knee/tibial tubercle Mass Excision ;  Surgeon: Clifford Aguilera MD;  Location: [facility] OR      Allergies   Allergen Reactions     Ibuprofen [Aspartame-Ibuprofen] Hives and Swelling     Pcn [Penicillins] Hives and Swelling     No problems with Cephalexin     Aspirin      Eyes swelled     Gadolinium Hives      Social History     Tobacco Use     Smoking status: Former     Packs/day: 1.00     Years: 25.00     Pack years: 25.00     Types: Cigarettes     Quit date: 10/9/2010     Years since quittin.3     Smokeless tobacco: Never   Substance Use Topics     Alcohol use: Yes     Comment: 3x/week, 6 drinks      Wt Readings from Last 1 Encounters:   10/10/22 82.4 kg (181 lb 11.2 oz)        Anesthesia Evaluation   Pt has had prior anesthetic.         ROS/MED HX  ENT/Pulmonary: Comment: History of c7 fracture - neg pulmonary ROS  (-) sleep apnea   Neurologic:       Cardiovascular:     (+) --CAD ---    METS/Exercise Tolerance:     Hematologic:       Musculoskeletal:       GI/Hepatic:     (+) appendicitis,  (-) GERD   Renal/Genitourinary:       Endo:       Psychiatric/Substance Use:     (+) alcohol abuse      Infectious Disease:       Malignancy:       Other:            Physical Exam    Airway        Mallampati: II   TM distance: > 3 FB   Neck ROM: full     Respiratory Devices and Support         Dental           Cardiovascular   cardiovascular exam normal          Pulmonary   pulmonary exam normal                OUTSIDE LABS:  CBC:   Lab Results   Component Value Date    WBC 15.0 (H) 02/02/2023    WBC 7.3 10/10/2022    HGB 15.3 02/02/2023    HGB 15.6 10/10/2022    HCT 44.3 02/02/2023    HCT 45.4 10/10/2022     02/02/2023     10/10/2022     BMP:   Lab Results   Component Value Date     02/02/2023     10/10/2022    POTASSIUM 4.2 02/02/2023    POTASSIUM 4.1 10/10/2022    CHLORIDE 104 02/02/2023    CHLORIDE 106 10/10/2022    CO2 22 02/02/2023    CO2 25 10/10/2022    BUN 8.5 02/02/2023    BUN 14 10/10/2022    CR 0.97 02/02/2023    CR 1.0 02/02/2023     (H) 02/02/2023    GLC 98 10/10/2022     COAGS:   Lab Results   Component Value Date    INR 1.1 02/03/2021     POC: No results found for: BGM, HCG, HCGS  HEPATIC:   Lab Results   Component Value Date    ALBUMIN 4.0 10/10/2022    PROTTOTAL 6.9 10/10/2022    ALT 30 10/10/2022    AST 25 10/10/2022    GGT 29 01/24/2013    ALKPHOS 54 10/10/2022    BILITOTAL 0.6 10/10/2022     OTHER:   Lab Results   Component Value Date    A1C 5.3 06/10/2021    JOHANN 9.2 02/02/2023    MAG 2.2 01/11/2011    AMYLASE 126 (H) 05/15/2014    TSH 1.64 06/10/2021    T4 1.16 01/02/2014    CRP <2.9 06/28/2019    SED 2 06/28/2019       Anesthesia Plan    ASA Status:  3, emergent    NPO Status:  NPO Appropriate    Anesthesia Type: General.     - Airway: ETT   Induction: RSI, Intravenous.           Consents    Anesthesia Plan(s) and associated risks, benefits, and realistic alternatives discussed. Questions answered and patient/representative(s) expressed understanding.    - Discussed:     - Discussed with:  Patient         Postoperative Care    Pain management: IV analgesics,  Oral pain medications, Multi-modal analgesia.   PONV prophylaxis: Ondansetron (or other 5HT-3), Dexamethasone or Solumedrol     Comments:                Satinder Chatterjee MD

## 2023-02-02 NOTE — OP NOTE
General Surgery Operative Note      Pre-operative diagnosis: Acute appendicitis   Post-operative diagnosis: Acute appendicitis    Procedure: laparoscopic appendectomy   Surgeon: Renetta Dillon MD   Assistant(s): None   Anesthesia: General    Estimated blood loss:  Complications: 10 ml  None   Specimens: ID Type Source Tests Collected by Time Destination   1 : APPENDIX Tissue Appendix SURGICAL PATHOLOGY EXAM Renetta Dillon MD 2/2/2023  1:26 PM         INDICATION FOR PROCEDURE: This is a 62 year old male who presented with abdominal pain of 2 day's duration. CT scan of the abdomen was consistent with acute appendicitis without evidence for abscess or perforation. We discussed operative management of appendicitis including risks and benefits, and the patient agreed to proceed.    DESCRIPTION OF PROCEDURE:  The patient was placed on the table in supine position.  General endotracheal anesthesia was induced and the abdomen was prepped and draped in standard sterile fashion.  An infraumbilical incision was made and a 12 mm Ara Trocar was placed under direct visualization.  Pneumoperitoneum was established and the abdomen was surveyed. A 5 mm trocar was placed in the suprapubic region, and a 5 mm trocar was placed in the left lower quadrant.  The patient was placed in Trendelenburg and right side up.  The appendix was identified in the right lower quadrant.  It appeared dilated and gangrenous. The base of the appendix was healthy-appearing and was divided using a white load of the endo-JEAN CLAUDE stapler after creating a window in the mesoappendix. The mesoappendix was divided using a grey load of the endo-JEAN CLAUDE stapler. The appendix was passed into an Endocatch bag and removed through the 12mm trocar site.  The right lower quadrant was inspected for hemostasis.  Hemostasis was assured.  We irrigated with sterile saline and aspirated the effluent.  The two 5 mm trocars were removed under direct visualization to ensure no  bleeding from port sites. The abdomen was evacuated of CO2.  The 12mm port site fascia was closed with 0 vicryl.  The skin of all 3 incisions was anesthetized with local anesthetic and closed with interrupted 4-0 Vicryl subcuticular sutures and skin glue.  The patient tolerated the procedure well.  Sponge and instrument counts were correct.    FINDINGS: Acute appendicitis without perforation    Renetta Dillon MD

## 2023-02-02 NOTE — ANESTHESIA PROCEDURE NOTES
Airway       Patient location during procedure: OR       Procedure Start/Stop Times: 2/2/2023 12:52 PM  Staff -        CRNA: Ciara Chávez APRN CRNA       Performed By: CRNA  Consent for Airway        Urgency: emergent  Indications and Patient Condition       Indications for airway management: bhupendra-procedural       Induction type:intravenous       Mask difficulty assessment: 1 - vent by mask    Final Airway Details       Final airway type: endotracheal airway       Successful airway: ETT - single  Endotracheal Airway Details        ETT size (mm): 8.0       Cuffed: yes       Successful intubation technique: direct laryngoscopy       DL Blade Type: MAC 4       Grade View of Cords: 2       Adjucts: stylet       Position: Right       Bite block used: Soft    Post intubation assessment        Placement verified by: capnometry        Number of attempts at approach: 1       Secured with: plastic tape       Ease of procedure: easy       Dentition: Intact    Medication(s) Administered   Medication Administration Time: 2/2/2023 12:52 PM

## 2023-02-02 NOTE — ED PROVIDER NOTES
History     Chief Complaint:  Abdominal Pain       HPI   Ladarius Gaytan is a 62 year old male who presents with abdominal pain that started yesterday. He says that the pain is primarily in the right lower quadrant and radiates some down towards the groin. He reports normal bowels and denies dysuria or hematuria. He ate last night and reports that he doesn't think it made the pain worse. The pain is much worse when he is trying to move. He has not tried any interventions prior to arrival. He has not eaten anything today.     Independent Historian: Patient provides own history    Review of External Notes: Screening physical from 10/10/2022, no significant changes to medication.    ROS:  Review of Systems   Gastrointestinal: Positive for abdominal pain. Negative for constipation and diarrhea.   Genitourinary: Negative for dysuria and hematuria.   All other systems reviewed and are negative.    Allergies:  Ibuprofen [Aspartame-Ibuprofen]  Pcn [Penicillins]  Aspirin  Gadolinium     Medications:    clomiphene  Acetylcysteine  Lactobacillus    Past Medical History:    Closed C7 fracture  Hemospermia  nocturia  Varicocele  Alcohol abuse  Coronary artery calcification   ED    Past Surgical History:    Microdiskectomy L4-5  Colonoscopy x 2  Mass excision lower extremity     Family History:    Patient was adopted, family history unknown.     Social History:  Reports that he quit smoking about 12 years ago. His smoking use included cigarettes. He has a 25.00 pack-year smoking history. He has never used smokeless tobacco. He reports current alcohol use. He reports that he does not use drugs.  Presents to the ED alone.     Physical Exam     Patient Vitals for the past 24 hrs:   BP Temp Temp src Pulse Resp SpO2   02/02/23 1051 -- -- -- -- -- 97 %   02/02/23 1036 -- -- -- -- -- 98 %   02/02/23 1021 -- -- -- -- -- 99 %   02/02/23 0845 (!) 140/83 97.3  F (36.3  C) Temporal 92 18 99 %      Physical Exam    Constitutional: Alert,  attentive, GCS 15   HENT:   Eyes: EOM are normal, anicteric, conjugate gaze  CV: distal extremities warm, well perfused  Chest: Non-labored breathing on RA  GI:  Focal right lower quadrant tenderness. No hernia palpated. No distension. No guarding or rebound.    Neurological: Alert, attentive, moving all extremities equally.   Skin: Skin is warm and dry.    Emergency Department Course     Imaging:  Abd/pelvis CT,  IV  contrast only TRAUMA / AAA   Preliminary Result   IMPRESSION:    1.  Acute appendicitis. Enlarged and inflamed fluid-filled appendix is   1.2 cm extending medial to the cecum. No abscess.   2.  Hyperdense left renal lesion is stable, but indeterminant. The   additional lesion described on 7/1/2021 is difficult to visualize.   Regular and correlation with nonurgent renal MRI.         Report per radiology    Laboratory:  Labs Ordered and Resulted from Time of ED Arrival to Time of ED Departure   BASIC METABOLIC PANEL - Abnormal       Result Value    Sodium 139      Potassium 4.2      Chloride 104      Carbon Dioxide (CO2) 22      Anion Gap 13      Urea Nitrogen 8.5      Creatinine 0.97      Calcium 9.2      Glucose 111 (*)     GFR Estimate 88     CBC WITH PLATELETS AND DIFFERENTIAL - Abnormal    WBC Count 15.0 (*)     RBC Count 4.67      Hemoglobin 15.3      Hematocrit 44.3      MCV 95      MCH 32.8      MCHC 34.5      RDW 12.1      Platelet Count 170      % Neutrophils 82      % Lymphocytes 8      % Monocytes 9      % Eosinophils 1      % Basophils 0      % Immature Granulocytes 0      NRBCs per 100 WBC 0      Absolute Neutrophils 12.2 (*)     Absolute Lymphocytes 1.2      Absolute Monocytes 1.4 (*)     Absolute Eosinophils 0.1      Absolute Basophils 0.0      Absolute Immature Granulocytes 0.1      Absolute NRBCs 0.0     ROUTINE UA WITH MICROSCOPIC - Normal    Color Urine Straw      Appearance Urine Clear      Glucose Urine Negative      Bilirubin Urine Negative      Ketones Urine Negative       Specific Gravity Urine 1.025      Blood Urine Negative      pH Urine 7.0      Protein Albumin Urine Negative      Urobilinogen Urine Normal      Nitrite Urine Negative      Leukocyte Esterase Urine Negative      RBC Urine 0      WBC Urine <1     ISTAT CREATININE POCT - Normal    Creatinine POCT 1.0      GFR, ESTIMATED POCT >60          Emergency Department Course & Assessments:    Interventions:  Medications   HYDROmorphone (PF) (DILAUDID) injection 0.5 mg (0 mg Intravenous Hold 23 0946)   ondansetron (ZOFRAN) injection 4 mg (has no administration in time range)   sodium chloride (PF) 0.9% PF flush 100 mL (has no administration in time range)   cefTRIAXone (ROCEPHIN) 1 g vial to attach to  mL bag for ADULTS or NS 50 mL bag for PEDS (has no administration in time range)   metroNIDAZOLE (FLAGYL) infusion 500 mg (has no administration in time range)   0.9% sodium chloride BOLUS (1,000 mLs Intravenous New Bag 23 0915)   iopamidol (ISOVUE-370) solution 500 mL (99 mLs Intravenous Given 23 1007)      Independent Interpretation (X-rays, CTs, rhythm strip):  I independently reviewed the patient's CT scan and see appendicitis and was able to diagnose likely appendicitis and start antibiotics prior to radiology read.    Consultations/Discussion of Management or Tests:  1105 I spoke with Dr. Dillon, general surgery, regarding surgery of the patient.     Assessments:  0901 I examined the patient and obtained history as noted above.   1053 I rechecked the patient and updated him on findings.     Disposition:  The patient was transferred to the OR under the care of Dr. Dillon.    Impression & Plan      Medical Decision Makin-year-old male past medical history seen for alcohol abuse, no prior abdominal surgeries presenting for right lower quadrant abdominal pain.  His history is good for acute appendicitis given generalized periumbilical abdominal pain that is migrated to his right lower quadrant overnight.   No reported fevers, no urinary symptoms or flank pain.  Exam is consistent with localized right lower quadrant tenderness.  CT imaging confirms uncomplicated appendicitis.  He does have mild leukocytosis but is afebrile with normal vitals, do not suspect sepsis.  He was given IV ceftriaxone and Flagyl given his penicillin allergy.  Case discussed with Dr. Dillon who arrange for the patient be taken to the OR for definitive intervention.      Diagnosis:    ICD-10-CM    1. Acute appendicitis with localized peritonitis, without perforation, abscess, or gangrene  K35.30 Case Request: APPENDECTOMY, LAPAROSCOPIC     Case Request: APPENDECTOMY, LAPAROSCOPIC         Sridhar Newell MD  [employer]  12:40 PM 02/02/23     Scribe Disclosure:  Ronaldo SOLANO, am serving as a scribe at 8:52 AM on 2/2/2023 to document services personally performed by Sridhar Newell MD based on my observations and the provider's statements to me.     2/2/2023   Sridhar Newell MD Dunbar, John Forrest, MD  02/02/23 [id number]

## 2023-02-03 LAB
PATH REPORT.COMMENTS IMP SPEC: NORMAL
PATH REPORT.COMMENTS IMP SPEC: NORMAL
PATH REPORT.FINAL DX SPEC: NORMAL
PATH REPORT.GROSS SPEC: NORMAL
PATH REPORT.MICROSCOPIC SPEC OTHER STN: NORMAL
PATH REPORT.RELEVANT HX SPEC: NORMAL
PHOTO IMAGE: NORMAL

## 2023-02-24 ENCOUNTER — TELEPHONE (OUTPATIENT)
Dept: SURGERY | Facility: CLINIC | Age: 63
End: 2023-02-24
Payer: COMMERCIAL

## 2023-02-24 NOTE — TELEPHONE ENCOUNTER
Attempted to call patient for post op check.  No answer.  Message was left for patient to call back if they had any questions of concerns.     Arlene Vital PA-C

## 2023-04-10 NOTE — PATIENT INSTRUCTIONS
Let's check labs today, we will notify you of results.    If you feel like pain worsens, you develop fever, blood in stool or vomit   Spontaneous

## 2023-04-11 ENCOUNTER — NURSE TRIAGE (OUTPATIENT)
Dept: PEDIATRICS | Facility: CLINIC | Age: 63
End: 2023-04-11
Payer: COMMERCIAL

## 2023-04-11 NOTE — TELEPHONE ENCOUNTER
Nurse Triage SBAR    Is this a 2nd Level Triage? NO    Situation: Patient reports he had some rectal bleeding with a formed dark stool today. No fevers, light-headedness, does have mild RUQ pain but states he has had this in the past. Not on blood thinners.    Background: No recent illnesses.    Assessment: Patient has been otherwise feeling fine. Will go to ED with any red flag symptoms.     Protocol Recommended Disposition:   See in Office Within 3 Days    Recommendation: Please review. Patient needs to schedule appointment.    Routed to provider    Does the patient meet one of the following criteria for ADS visit consideration? No  Reason for Disposition    MILD rectal bleeding (more than just a few drops or streaks)    Additional Information    Negative: Passed out (i.e., fainted, collapsed and was not responding)    Negative: Shock suspected (e.g., cold/pale/clammy skin, too weak to stand, low BP, rapid pulse)    Negative: Vomiting red blood or black (coffee ground) material    Negative: Sounds like a life-threatening emergency to the triager    Negative: SEVERE rectal bleeding (large blood clots; constant or on and off bleeding)    Negative: SEVERE dizziness (e.g., unable to stand, requires support to walk, feels like passing out now)    Negative: MODERATE rectal bleeding (small blood clots, passing blood without stool, or toilet water turns red) more than once a day    Negative: Bloody, black, or tarry bowel movements  (Exception: Chronic-unchanged black-grey bowel movements and is taking iron pills or Pepto-Bismol.)    Negative: High-risk adult (e.g., prior surgery on aorta, abdominal aortic aneurysm)    Negative: Rectal foreign body (inserted or swallowed)    Negative: SEVERE abdominal pain (e.g., excruciating)    Negative: Constant abdominal pain lasting > 2 hours    Negative: Pale skin (pallor) of new-onset or worsening    Negative: Patient sounds very sick or weak to the triager    Negative: MODERATE  rectal bleeding (small blood clots, passing blood without stool, or toilet water turns red)    Negative: Taking Coumadin (warfarin) or other strong blood thinner, or known bleeding disorder (e.g., thrombocytopenia)    Negative: Colonoscopy in past 72 hours    Negative: Known cirrhosis of the liver (or history of liver failure or ascites)    Negative: Patient wants to be seen    Protocols used: RECTAL BLEEDING-A-OH

## 2023-04-13 ENCOUNTER — OFFICE VISIT (OUTPATIENT)
Dept: PEDIATRICS | Facility: CLINIC | Age: 63
End: 2023-04-13
Payer: COMMERCIAL

## 2023-04-13 VITALS
RESPIRATION RATE: 18 BRPM | TEMPERATURE: 97.3 F | HEIGHT: 72 IN | OXYGEN SATURATION: 98 % | HEART RATE: 80 BPM | BODY MASS INDEX: 24.15 KG/M2 | SYSTOLIC BLOOD PRESSURE: 104 MMHG | WEIGHT: 178.3 LBS | DIASTOLIC BLOOD PRESSURE: 62 MMHG

## 2023-04-13 DIAGNOSIS — K92.1 BLOOD IN STOOL: Primary | ICD-10-CM

## 2023-04-13 LAB
ALBUMIN SERPL BCG-MCNC: 4.4 G/DL (ref 3.5–5.2)
ALP SERPL-CCNC: 55 U/L (ref 40–129)
ALT SERPL W P-5'-P-CCNC: 23 U/L (ref 10–50)
ANION GAP SERPL CALCULATED.3IONS-SCNC: 12 MMOL/L (ref 7–15)
AST SERPL W P-5'-P-CCNC: 27 U/L (ref 10–50)
BASOPHILS # BLD AUTO: 0 10E3/UL (ref 0–0.2)
BASOPHILS NFR BLD AUTO: 1 %
BILIRUB SERPL-MCNC: 0.3 MG/DL
BUN SERPL-MCNC: 12.8 MG/DL (ref 8–23)
CALCIUM SERPL-MCNC: 9.4 MG/DL (ref 8.8–10.2)
CHLORIDE SERPL-SCNC: 106 MMOL/L (ref 98–107)
CREAT SERPL-MCNC: 1.06 MG/DL (ref 0.67–1.17)
DEPRECATED HCO3 PLAS-SCNC: 24 MMOL/L (ref 22–29)
EOSINOPHIL # BLD AUTO: 0.3 10E3/UL (ref 0–0.7)
EOSINOPHIL NFR BLD AUTO: 5 %
ERYTHROCYTE [DISTWIDTH] IN BLOOD BY AUTOMATED COUNT: 12.3 % (ref 10–15)
GFR SERPL CREATININE-BSD FRML MDRD: 79 ML/MIN/1.73M2
GLUCOSE SERPL-MCNC: 100 MG/DL (ref 70–99)
HCT VFR BLD AUTO: 46.6 % (ref 40–53)
HGB BLD-MCNC: 15.7 G/DL (ref 13.3–17.7)
LYMPHOCYTES # BLD AUTO: 2 10E3/UL (ref 0.8–5.3)
LYMPHOCYTES NFR BLD AUTO: 32 %
MCH RBC QN AUTO: 32.2 PG (ref 26.5–33)
MCHC RBC AUTO-ENTMCNC: 33.7 G/DL (ref 31.5–36.5)
MCV RBC AUTO: 96 FL (ref 78–100)
MONOCYTES # BLD AUTO: 0.8 10E3/UL (ref 0–1.3)
MONOCYTES NFR BLD AUTO: 12 %
NEUTROPHILS # BLD AUTO: 3.2 10E3/UL (ref 1.6–8.3)
NEUTROPHILS NFR BLD AUTO: 50 %
PLATELET # BLD AUTO: 174 10E3/UL (ref 150–450)
POTASSIUM SERPL-SCNC: 4.2 MMOL/L (ref 3.4–5.3)
PROT SERPL-MCNC: 6.9 G/DL (ref 6.4–8.3)
RBC # BLD AUTO: 4.87 10E6/UL (ref 4.4–5.9)
SODIUM SERPL-SCNC: 142 MMOL/L (ref 136–145)
WBC # BLD AUTO: 6.3 10E3/UL (ref 4–11)

## 2023-04-13 PROCEDURE — 80053 COMPREHEN METABOLIC PANEL: CPT | Performed by: INTERNAL MEDICINE

## 2023-04-13 PROCEDURE — 36415 COLL VENOUS BLD VENIPUNCTURE: CPT | Performed by: INTERNAL MEDICINE

## 2023-04-13 PROCEDURE — 85025 COMPLETE CBC W/AUTO DIFF WBC: CPT | Performed by: INTERNAL MEDICINE

## 2023-04-13 PROCEDURE — 99213 OFFICE O/P EST LOW 20 MIN: CPT | Performed by: INTERNAL MEDICINE

## 2023-04-13 ASSESSMENT — PAIN SCALES - GENERAL: PAINLEVEL: NO PAIN (0)

## 2023-04-13 NOTE — PATIENT INSTRUCTIONS
Lab work today:  We can do labs in the exam room today, or you can get them done downstairs in the lab.       a FIT test (stool test for hidden blood) downstairs before leaving.    Let us know if you get black stools, or blood in the stool.    Stevie Hood MD  Internal Medicine and Pediatrics

## 2023-04-13 NOTE — PROGRESS NOTES
"  Assessment & Plan     Blood in stool    I do not believe that his actually blood; likely diet related.  Was just a small black \"patch\" on his stool.  Normal colonoscopy less than 2 years ago.  Let us know if symptoms worsen.  Avoid nonsteroidals (like ibuprofen or aspirin or naproxen).     Patient Instructions   Lab work today:  We can do labs in the exam room today, or you can get them done downstairs in the lab.       a FIT test (stool test for hidden blood) downstairs before leaving.    Let us know if you get black stools, or blood in the stool.    Stevie Hood MD  Internal Medicine and Pediatrics        - CBC with platelets and differential; Future  - Comprehensive metabolic panel (BMP + Alb, Alk Phos, ALT, AST, Total. Bili, TP); Future  - Fecal colorectal cancer screen (FIT); Future             See Patient Instructions    Stevie Hood MD  [facility] ELIANA Durand is a 62 year old, presenting for the following health issues:  Black Stool         View : No data to display.              History of Present Illness       Reason for visit:  Black patch in stool  Symptom onset:  1-2 weeks ago  Symptoms include:  Black patch in stool seemed to ozze blood  Symptom intensity:  Moderate  Symptom progression:  Improving  Had these symptoms before:  No  What makes it worse:  Na  What makes it better:  Na    He eats 2-3 servings of fruits and vegetables daily.He consumes 1 sweetened beverage(s) daily.He exercises with enough effort to increase his heart rate 30 to 60 minutes per day.  He exercises with enough effort to increase his heart rate 5 days per week.   He is taking medications regularly.       Concern - blood, black in stool  Onset: 3 days ago  Description:blood oozing out of black stool  Intensity: mild  Progression of Symptoms:  2 weeks ago had black in joel  Accompanying Signs & Symptoms: none  Previous history of similar problem:   Precipitating factors:        Worsened by: " "  Alleviating factors:        Improved by:   Therapies tried and outcome:  none         Noted some blood in stool; black color to stool.  Does eat a lot of beets, pureed.    2 days ago, noted a \"patch\" of blood on the side of his stool; was black.    Normal colonoscopy 2 years ago.    Changed from culturelle to another probiotic; \"now brand\".  Prior to this, had been taking a root from [place] (Tomcat Ali?) as a supplement.  Had been making him feel fatigued at times, 3-4 hours each afternoon.  Was then told by his dentist that he should use some water pick for some periodontal disease.  1 month ago, itching on his body, which went away when stopped using the water pick/listerine.    No significant blood in urine or bruising.  No aspirin or ibuprofen.     Review of Systems   CONSTITUTIONAL: NEGATIVE for fever, chills, change in weight  INTEGUMENTARY/SKIN: NEGATIVE for worrisome rashes, moles or lesions  EYES: NEGATIVE for vision changes or irritation  ENT/MOUTH: NEGATIVE for ear, mouth and throat problems  RESP: NEGATIVE for significant cough or SOB  BREAST: NEGATIVE for masses, tenderness or discharge  CV: NEGATIVE for chest pain, palpitations or peripheral edema  GI: NEGATIVE for nausea, abdominal pain, heartburn, or change in bowel habits  : NEGATIVE for frequency, dysuria, or hematuria  MUSCULOSKELETAL: NEGATIVE for significant arthralgias or myalgia  NEURO: NEGATIVE for weakness, dizziness or paresthesias  ENDOCRINE: NEGATIVE for temperature intolerance, skin/hair changes  HEME: NEGATIVE for bleeding problems  PSYCHIATRIC: NEGATIVE for changes in mood or affect      Objective    /62 (BP Location: Right arm, Patient Position: Sitting, Cuff Size: Adult Regular)   Pulse 80   Temp 97.3  F (36.3  C) (Tympanic)   Resp 18   Ht 1.829 m (6')   Wt 80.9 kg (178 lb 4.8 oz)   SpO2 98%   BMI 24.18 kg/m    Body mass index is 24.18 kg/m .  Physical Exam   GENERAL: healthy, alert and no distress  EYES: Eyes " grossly normal to inspection, PERRL and conjunctivae and sclerae normal  HENT: ear canals and TM's normal, nose and mouth without ulcers or lesions  NECK: no adenopathy, no asymmetry, masses, or scars and thyroid normal to palpation  RESP: lungs clear to auscultation - no rales, rhonchi or wheezes  CV: regular rate and rhythm, normal S1 S2, no S3 or S4, no murmur, click or rub, no peripheral edema and peripheral pulses strong  ABDOMEN: soft, nontender, no hepatosplenomegaly, no masses and bowel sounds normal  MS: no gross musculoskeletal defects noted, no edema  SKIN: no suspicious lesions or rashes  NEURO: Normal strength and tone, mentation intact and speech normal  PSYCH: mentation appears normal, affect normal/bright

## 2023-04-20 PROCEDURE — 82274 ASSAY TEST FOR BLOOD FECAL: CPT | Performed by: INTERNAL MEDICINE

## 2023-04-24 LAB — HEMOCCULT STL QL IA: NEGATIVE

## 2023-07-04 ENCOUNTER — OFFICE VISIT (OUTPATIENT)
Dept: URGENT CARE | Facility: URGENT CARE | Age: 63
End: 2023-07-04
Payer: COMMERCIAL

## 2023-07-04 VITALS
HEART RATE: 68 BPM | DIASTOLIC BLOOD PRESSURE: 72 MMHG | BODY MASS INDEX: 23.73 KG/M2 | WEIGHT: 175 LBS | SYSTOLIC BLOOD PRESSURE: 112 MMHG | OXYGEN SATURATION: 98 % | TEMPERATURE: 98.9 F
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
## 2023-07-04 DIAGNOSIS — S90.861A TICK BITE OF RIGHT FOOT, INITIAL ENCOUNTER: Primary | ICD-10-CM

## 2023-07-04 PROCEDURE — 99212 OFFICE O/P EST SF 10 MIN: CPT | Performed by: PHYSICIAN ASSISTANT

## 2023-07-04 ASSESSMENT — ENCOUNTER SYMPTOMS
CONSTITUTIONAL NEGATIVE: 1
GASTROINTESTINAL NEGATIVE: 1
RESPIRATORY NEGATIVE: 1
CARDIOVASCULAR NEGATIVE: 1

## 2023-07-04 NOTE — PROGRESS NOTES
Assessment & Plan:        ICD-10-CM    1. Tick bite of right foot, initial encounter  S90.861A     W57.XXXA             Plan/Clinical Decision Making:    Patient presents with recent tick bite between his 3rd and 4th toe.  He thinks he got the tick on Friday.  Patient pulled the tick out on the 3rd day which was Sunday. Patient now is having symptoms of slight erythema and pain in-between 3rd and 4th toe. Patient reports no systemic issues and the sight of where the tick  looks clear. Patient was informed that if he develops any systemic issues like fever, joint pain, rash on body to return for further evaluation. No signs of erythema migrans at visit. Low suspicion for Lymes due to brown, larger tick. Was not deer tick.     Return if symptoms worsen or fail to improve, for in 5-7 days.     At the end of the encounter, I discussed results, diagnosis, medications. Discussed red flags for immediate return to clinic/ER, as well as indications for follow up if no improvement. Patient understood and agreed to plan. Patient was stable for discharge.    Physician Attestation   I, Wendi Atkins PA-C, was present with the medical/TAMARA student who participated in the service and in the documentation of the note.  I have verified the history and personally performed the physical exam and medical decision making.  I agree with the assessment and plan of care as documented in the note.        Wendi Atkins PA-C  Seen with student:     Russell Ramirez           Subjective:     HPI:    Ladarius is a 63 year old male who presents to clinic today for the following health issues:  Chief Complaint   Patient presents with     Urgent Care     Found tick X2 days ago between toes on right foot, [place], right foot has numbness from back surgery X11 years ago,   Pain between toes and redness , patient says this might be a dog tick,   Used alcohol on the area and neosporin      HPI  Patient had a tick in between in  3rd and 4th toe on his right foot. Patient believes he had a tick on him for 3 days. Patient pulled it off on  and is now having pain with palpation and is having some erythema. Patient states he had back surgery awhile ago and doesn't have much feeling in his feet. Patient denies fevers, chills, shortness of breath, chest pain, abnormal target rash.     Not a deer tick, larger, brown tick.     History obtained from the patient.    Review of Systems   Constitutional: Negative.    HENT: Negative.    Respiratory: Negative.    Cardiovascular: Negative.    Gastrointestinal: Negative.    Genitourinary: Negative.    Skin: Positive for rash.         Patient Active Problem List   Diagnosis     Lumbar radiculopathy     Renal cyst     Vitamin D deficiency     Alcohol abuse     Impaired fasting glucose     Erectile dysfunction     Coronary artery calcification seen on computed tomography        Past Medical History:   Diagnosis Date     Closed C7 fracture without spinal cord injury, initial encounter (H) 2020     Disorder of ligament 2020       Social History     Tobacco Use     Smoking status: Former     Packs/day: 1.00     Years: 25.00     Pack years: 25.00     Types: Cigarettes     Quit date: 10/9/2010     Years since quittin.7     Smokeless tobacco: Never   Substance Use Topics     Alcohol use: Yes     Comment: 3x/week, 6 drinks           Objective:     Vitals:    23 1137   BP: 112/72   Pulse: 68   Temp: 98.9  F (37.2  C)   TempSrc: Oral   SpO2: 98%   Weight: 79.4 kg (175 lb)         Physical Exam   EXAM:   Pleasant, alert, appropriate appearance. NAD.    OroPharynx Exam:  Moist mucous membranes. No erythema, pharynx without exudate or hypertrophy.  Neck/Thyroid Exam:  No LAD.  No nodules or enlargement.  Chest/Respiratory Exam: CTAB.  Cardiovascular Exam: RRR. No murmur or rubs.  Skin: Faint Erythema on top of right feet. Slight pain with palpation in between 3rd and 4th teeth. No bullseye  rash.       Results:  No results found for any visits on 07/04/23.

## 2023-11-07 ENCOUNTER — OFFICE VISIT (OUTPATIENT)
Dept: PEDIATRICS | Facility: CLINIC | Age: 63
End: 2023-11-07
Payer: COMMERCIAL

## 2023-11-07 VITALS
TEMPERATURE: 97.7 F | OXYGEN SATURATION: 98 % | HEIGHT: 72 IN | HEART RATE: 71 BPM | BODY MASS INDEX: 23.81 KG/M2 | SYSTOLIC BLOOD PRESSURE: 106 MMHG | RESPIRATION RATE: 14 BRPM | DIASTOLIC BLOOD PRESSURE: 72 MMHG | WEIGHT: 175.8 LBS

## 2023-11-07 DIAGNOSIS — R73.01 IMPAIRED FASTING GLUCOSE: ICD-10-CM

## 2023-11-07 DIAGNOSIS — M54.16 LUMBAR RADICULOPATHY: ICD-10-CM

## 2023-11-07 DIAGNOSIS — E29.1 HYPOGONADISM MALE: ICD-10-CM

## 2023-11-07 DIAGNOSIS — Z00.00 ENCOUNTER FOR ROUTINE ADULT HEALTH EXAMINATION WITHOUT ABNORMAL FINDINGS: Primary | ICD-10-CM

## 2023-11-07 DIAGNOSIS — I25.10 CORONARY ARTERY CALCIFICATION SEEN ON COMPUTED TOMOGRAPHY: ICD-10-CM

## 2023-11-07 DIAGNOSIS — N52.9 ERECTILE DYSFUNCTION, UNSPECIFIED ERECTILE DYSFUNCTION TYPE: ICD-10-CM

## 2023-11-07 DIAGNOSIS — Z12.5 SPECIAL SCREENING FOR MALIGNANT NEOPLASM OF PROSTATE: ICD-10-CM

## 2023-11-07 LAB
ALBUMIN SERPL BCG-MCNC: 4.6 G/DL (ref 3.5–5.2)
ALP SERPL-CCNC: 55 U/L (ref 40–129)
ALT SERPL W P-5'-P-CCNC: 20 U/L (ref 0–70)
ANION GAP SERPL CALCULATED.3IONS-SCNC: 14 MMOL/L (ref 7–15)
AST SERPL W P-5'-P-CCNC: 25 U/L (ref 0–45)
BILIRUB SERPL-MCNC: 0.5 MG/DL
BUN SERPL-MCNC: 13.6 MG/DL (ref 8–23)
CALCIUM SERPL-MCNC: 9.8 MG/DL (ref 8.8–10.2)
CHLORIDE SERPL-SCNC: 102 MMOL/L (ref 98–107)
CHOLEST SERPL-MCNC: 185 MG/DL
CREAT SERPL-MCNC: 1.07 MG/DL (ref 0.67–1.17)
DEPRECATED HCO3 PLAS-SCNC: 24 MMOL/L (ref 22–29)
EGFRCR SERPLBLD CKD-EPI 2021: 78 ML/MIN/1.73M2
GLUCOSE SERPL-MCNC: 100 MG/DL (ref 70–99)
HBA1C MFR BLD: 5 % (ref 0–5.6)
HDLC SERPL-MCNC: 59 MG/DL
LDLC SERPL CALC-MCNC: 99 MG/DL
NONHDLC SERPL-MCNC: 126 MG/DL
POTASSIUM SERPL-SCNC: 4.2 MMOL/L (ref 3.4–5.3)
PROT SERPL-MCNC: 7.2 G/DL (ref 6.4–8.3)
PSA SERPL DL<=0.01 NG/ML-MCNC: 0.68 NG/ML (ref 0–4.5)
SODIUM SERPL-SCNC: 140 MMOL/L (ref 135–145)
TRIGL SERPL-MCNC: 135 MG/DL

## 2023-11-07 PROCEDURE — 80061 LIPID PANEL: CPT | Performed by: INTERNAL MEDICINE

## 2023-11-07 PROCEDURE — 99396 PREV VISIT EST AGE 40-64: CPT | Performed by: INTERNAL MEDICINE

## 2023-11-07 PROCEDURE — 83036 HEMOGLOBIN GLYCOSYLATED A1C: CPT | Performed by: INTERNAL MEDICINE

## 2023-11-07 PROCEDURE — G0103 PSA SCREENING: HCPCS | Performed by: INTERNAL MEDICINE

## 2023-11-07 PROCEDURE — 80053 COMPREHEN METABOLIC PANEL: CPT | Performed by: INTERNAL MEDICINE

## 2023-11-07 PROCEDURE — 36415 COLL VENOUS BLD VENIPUNCTURE: CPT | Performed by: INTERNAL MEDICINE

## 2023-11-07 PROCEDURE — 99213 OFFICE O/P EST LOW 20 MIN: CPT | Mod: 25 | Performed by: INTERNAL MEDICINE

## 2023-11-07 ASSESSMENT — ENCOUNTER SYMPTOMS
ARTHRALGIAS: 1
ABDOMINAL PAIN: 1

## 2023-11-07 NOTE — PROGRESS NOTES
SUBJECTIVE:   CC: Ladarius is an 63 year old who presents for preventative health visit.       2023     9:02 AM   Additional Questions   Roomed by Sandrine Meza CMA       Healthy Habits:     Getting at least 3 servings of Calcium per day:  Yes    Bi-annual eye exam:  NO    Dental care twice a year:  Yes    Sleep apnea or symptoms of sleep apnea:  Daytime drowsiness    Diet:  Other    Frequency of exercise:  2-3 days/week    Duration of exercise:  30-45 minutes    Taking medications regularly:  Yes    Medication side effects:  None    Additional concerns today:  Yes                      Social History     Tobacco Use    Smoking status: Former     Packs/day: 1.00     Years: 25.00     Additional pack years: 0.00     Total pack years: 25.00     Types: Cigarettes     Quit date: 10/9/2010     Years since quittin.0    Smokeless tobacco: Never   Substance Use Topics    Alcohol use: Yes     Comment: 3x/week, 6 drinks         Right lower quad pain on and off; s/p appy.  CT 8 months ago.   WOndering if related to appy.  Gets it sproradically for several months. No constipation.  No gastroesophageal reflux disease.     Runs 2 miles, but not recently  due to worse low back pain.    The 10-year ASCVD risk score ([place] DK, et al., 2019) is: 7.1%    Values used to calculate the score:      Age: 63 years      Sex: Male      Is Non- : No      Diabetic: No      Tobacco smoker: No      Systolic Blood Pressure: 106 mmHg      Is BP treated: No      HDL Cholesterol: 56 mg/dL      Total Cholesterol: 189 mg/dL       Back is worse with running.          2023     8:58 AM   Alcohol Use   Prescreen: >3 drinks/day or >7 drinks/week? No       Last PSA:   Prostate Specific Antigen Screen   Date Value Ref Range Status   10/10/2022 0.89 0.00 - 4.00 ug/L Final       Reviewed orders with patient. Reviewed health maintenance and updated orders accordingly - Yes  Lab work is in process  Labs reviewed in  EPIC    Reviewed and updated as needed this visit by clinical staff   Tobacco  Allergies  Meds              Reviewed and updated as needed this visit by Provider                   Past Medical History:   Diagnosis Date    Closed C7 fracture without spinal cord injury, initial encounter (H) 08/27/2020    Disorder of ligament 08/28/2020      Past Surgical History:   Procedure Laterality Date    BACK SURGERY      micrdiskectomy done by [facility]     COLONOSCOPY  11/18/2011    Procedure:COLONOSCOPY; COLONOSCOPY; Surgeon:JESE FRANKLIN; Location: GI    COLONOSCOPY Left 5/5/2021    Procedure: COLONOSCOPY;  Surgeon: Alfredo Munoz MD;  Location:  GI    DISCECTOMY LUMBAR MINIMALLY INVASIVE ONE LEVEL  2011    right L4-5 microdiscectomy    EXCISE MASS LOWER EXTREMITY  2/12/2014    Procedure: EXCISE MASS LOWER EXTREMITY;  Right Knee/tibial tubercle Mass Excision ;  Surgeon: Clifford Aguilera MD;  Location:  OR    LAPAROSCOPIC APPENDECTOMY N/A 2/2/2023    Procedure: LAPAROSCOPIC APPENDECTOMY;  Surgeon: Renetta Dillon MD;  Location:  OR       Review of Systems   Gastrointestinal:  Positive for abdominal pain.   Musculoskeletal:  Positive for arthralgias.     CONSTITUTIONAL: NEGATIVE for fever, chills, change in weight  INTEGUMENTARY/SKIN: NEGATIVE for worrisome rashes, moles or lesions  EYES: NEGATIVE for vision changes or irritation  ENT: NEGATIVE for ear, mouth and throat problems  RESP: NEGATIVE for significant cough or SOB  CV: NEGATIVE for chest pain, palpitations or peripheral edema  GI: NEGATIVE for nausea, abdominal pain, heartburn, or change in bowel habits   male: negative for dysuria, hematuria, decreased urinary stream, erectile dysfunction, urethral discharge  MUSCULOSKELETAL: NEGATIVE for significant arthralgias or myalgia  NEURO: NEGATIVE for weakness, dizziness or paresthesias  PSYCHIATRIC: NEGATIVE for changes in mood or affect    OBJECTIVE:   /72 (BP Location: Right arm,  Patient Position: Sitting, Cuff Size: Adult Large)   Pulse 71   Temp 97.7  F (36.5  C)   Resp 14   Ht 1.829 m (6')   Wt 79.7 kg (175 lb 12.8 oz)   SpO2 98%   BMI 23.84 kg/m      Physical Exam  GENERAL: healthy, alert and no distress  EYES: Eyes grossly normal to inspection, PERRL and conjunctivae and sclerae normal  HENT: ear canals and TM's normal, nose and mouth without ulcers or lesions  NECK: no adenopathy, no asymmetry, masses, or scars and thyroid normal to palpation  RESP: lungs clear to auscultation - no rales, rhonchi or wheezes  CV: regular rate and rhythm, normal S1 S2, no S3 or S4, no murmur, click or rub, no peripheral edema and peripheral pulses strong  ABDOMEN: soft, nontender, no hepatosplenomegaly, no masses and bowel sounds normal   (male): normal male genitalia without lesions or urethral discharge, no hernia  RECTAL: normal sphincter tone, no rectal masses, prostate normal size, smooth, nontender without nodules or masses  MS: no gross musculoskeletal defects noted, no edema  SKIN: no suspicious lesions or rashes  NEURO: Normal strength and tone, mentation intact and speech normal  PSYCH: mentation appears normal, affect normal/bright    Diagnostic Test Results:  Labs reviewed in Epic    ASSESSMENT/PLAN:   (Z00.00) Encounter for routine adult health examination without abnormal findings  (primary encounter diagnosis)  Comment:   Plan: Discussed diet, exercise, testicular self exam, blood pressure, cholesterol, and need for cancer surveillance at appropriate ages.     (I25.10) Coronary artery calcification seen on computed tomography  Comment:   Plan: CANCELED: CT Coronary Calcium Scan        Patient does not wish to proceed with CT calcium score.  Will work on diet and limiting alcohol.  LDL cholesterol has been excellent.     (R73.01) Impaired fasting glucose  Comment:   Plan: Lipid panel reflex to direct LDL Fasting,         Comprehensive metabolic panel (BMP + Alb, Alk         Phos,  ALT, AST, Total. Bili, TP), Hemoglobin         A1c, OFFICE/OUTPT VISIT,EST,LEVL III        Labs today.     (M54.16) Lumbar radiculopathy  Comment:   Plan: ongoing back issues.  Try acetaminophen 1000 three times daily.      (N52.9) Erectile dysfunction, unspecified erectile dysfunction type  Comment:   Plan: OFFICE/OUTPT VISIT,EST,LEVL III            (Z12.5) Special screening for malignant neoplasm of prostate  Comment:   Plan: PSA, screen        On clomiphene per urology .    (E29.1) Hypogonadism male  Comment:   Plan: OFFICE/OUTPT VISIT,EST,LEVL III        On clomiphene per urology.           COUNSELING:   Reviewed preventive health counseling, as reflected in patient instructions       Regular exercise       Healthy diet/nutrition       Vision screening        He reports that he quit smoking about 13 years ago. His smoking use included cigarettes. He has a 25.00 pack-year smoking history. He has never used smokeless tobacco.            Stevie Hood MD  [facility]

## 2024-01-30 ENCOUNTER — TRANSFERRED RECORDS (OUTPATIENT)
Dept: HEALTH INFORMATION MANAGEMENT | Facility: CLINIC | Age: 64
End: 2024-01-30
Payer: COMMERCIAL

## 2024-04-09 ENCOUNTER — TRANSFERRED RECORDS (OUTPATIENT)
Dept: HEALTH INFORMATION MANAGEMENT | Facility: CLINIC | Age: 64
End: 2024-04-09
Payer: COMMERCIAL

## 2024-04-17 ENCOUNTER — TRANSFERRED RECORDS (OUTPATIENT)
Dept: HEALTH INFORMATION MANAGEMENT | Facility: CLINIC | Age: 64
End: 2024-04-17
Payer: COMMERCIAL

## 2024-09-06 ENCOUNTER — HOSPITAL ENCOUNTER (EMERGENCY)
Facility: CLINIC | Age: 64
Discharge: HOME | End: 2024-09-06
Attending: EMERGENCY MEDICINE | Admitting: EMERGENCY MEDICINE
Payer: COMMERCIAL

## 2024-09-06 VITALS
DIASTOLIC BLOOD PRESSURE: 86 MMHG | OXYGEN SATURATION: 97 % | SYSTOLIC BLOOD PRESSURE: 132 MMHG | WEIGHT: 175 LBS | RESPIRATION RATE: 16 BRPM | TEMPERATURE: 99.7 F | HEIGHT: 72 IN | HEART RATE: 83 BPM | BODY MASS INDEX: 23.7 KG/M2

## 2024-09-06 DIAGNOSIS — K04.7 DENTAL INFECTION: ICD-10-CM

## 2024-09-06 PROCEDURE — 99283 EMERGENCY DEPT VISIT LOW MDM: CPT

## 2024-09-06 PROCEDURE — 250N000013 HC RX MED GY IP 250 OP 250 PS 637

## 2024-09-06 RX ORDER — CLINDAMYCIN HCL 150 MG
300 CAPSULE ORAL ONCE
Status: COMPLETED | OUTPATIENT
Start: 2024-09-06 | End: 2024-09-06

## 2024-09-06 RX ORDER — CLINDAMYCIN HCL 300 MG
300 CAPSULE ORAL 4 TIMES DAILY
Qty: 40 CAPSULE | Refills: 0 | Status: SHIPPED | OUTPATIENT
Start: 2024-09-06 | End: 2024-09-16

## 2024-09-06 RX ADMIN — CLINDAMYCIN HYDROCHLORIDE 300 MG: 150 CAPSULE ORAL at 21:40

## 2024-09-06 ASSESSMENT — COLUMBIA-SUICIDE SEVERITY RATING SCALE - C-SSRS
6. HAVE YOU EVER DONE ANYTHING, STARTED TO DO ANYTHING, OR PREPARED TO DO ANYTHING TO END YOUR LIFE?: NO
1. IN THE PAST MONTH, HAVE YOU WISHED YOU WERE DEAD OR WISHED YOU COULD GO TO SLEEP AND NOT WAKE UP?: NO
2. HAVE YOU ACTUALLY HAD ANY THOUGHTS OF KILLING YOURSELF IN THE PAST MONTH?: NO

## 2024-09-07 NOTE — ED PROVIDER NOTES
EMERGENCY DEPARTMENT ENCOUNTER      NAME: Ladarius Gaytan  AGE: 64 year old male  YOB: 1960  MRN: [id number]  EVALUATION DATE & TIME: No admission date for patient encounter.    PCP: Stevie Hood    ED PROVIDER: Alexia Rivera PA-C      CHIEF COMPLAINT:  Dental Pain      FINAL IMPRESSION:  1. Dental infection          ED COURSE & MEDICAL DECISION MAKING:  Pertinent Labs & Imaging studies reviewed. (See chart for details)       The patient is an otherwise healthy 64 year old-year-old male presenting to the emergency department for evaluation of dental pain onset yesterday after temporary crown placement 3 days ago.  Temperature 99.1  F at home.  No analgesic medication taken prior to arrival.  He has upcoming appointment with his dentist for follow-up.    Initial vital signs reviewed and all within normal limits, temperature 99.7  F.  On exam, he is clinically well-appearing and nontoxic appearing in no acute distress. Tenderness to palpation at tooth #31 which has a crown present. No erythema, increased warmth, fluctuance. Floor of the mouth is soft. No trismus. He is tolerating his secretions, no drooling. Ranging neck freely.     No palpable or visible abscess. Given recent temporary crown placement, will treat with course of antibiotics. Reviewed the patient's allergy list,, will prescribe clindamycin with first dose given in the emergency department.  No evidence of extension of infection into bone or surrounding soft tissues..  Advised to follow up with dentist as soon as possible.  Patient is discharged in stable condition after all questions are answered.       Discussed plan for discharge to home with close outpatient follow up with his dentist. The patient verbalized understanding and is comfortable with this plan. Symptomatic home cares discussed. Emphasized importance of follow up with primary care clinician. Strict return precautions discussed.     At the conclusion of the  encounter I discussed the results of all of the tests and the disposition. The questions were answered. The patient or family acknowledged understanding and was agreeable with the care plan.       ED COURSE:  9:01 PM I met and introduced myself to the patient. I gathered initial history and performed an initial physical exam. We discussed options and plan for diagnostics and treatment here in the ED.  9:21 PM I have staffed the patient with Dr. Gonzalez, ED physician, who has evaluated the patient and agrees with all aspects of today's care.   I discussed the plan for discharge with the patient, and patient is agreeable. We discussed supportive cares at home and reasons for return to the ER including new or worsening symptoms - all questions and concerns addressed to the best of my ability. Strict return precautions discussed. Patient to be discharged by RN.        MEDICATIONS GIVEN IN THE EMERGENCY:  Medications   clindamycin (CLEOCIN) capsule 300 mg (300 mg Oral $Given 9/6/24 1172)       NEW PRESCRIPTIONS STARTED AT TODAY'S ER VISIT  Discharge Medication List as of 9/6/2024  9:42 PM        START taking these medications    Details   clindamycin (CLEOCIN) 300 MG capsule Take 1 capsule (300 mg) by mouth 4 times daily for 10 days., Disp-40 capsule, R-0, Local Print                =================================================================    HPI    Patient information was obtained from: Patient     Use of Intrepreter: N/A      Ladarius Gaytan is a 64 year old male with no pertinent medical history who presents to the emergency department for evaluation of dental pain.     The patient reports dental pain and left-sided jaw swelling. He had a temporary dental crown placed on 9/3. Patient started developing pain this morning, the pain worsened as the day passed. He was able to eat and drink without difficulty. Patient took his temperature which measured to be 99.1.     No blood thinners. He has not taken any  medication for pain relief.     Denies vomiting or any other associated symptoms at this time.  No preceding trauma or injury.    PAST MEDICAL HISTORY:  Past Medical History:   Diagnosis Date    Closed C7 fracture without spinal cord injury, initial encounter (H) 08/27/2020    Disorder of ligament 08/28/2020       PAST SURGICAL HISTORY:  Past Surgical History:   Procedure Laterality Date    BACK SURGERY      micrdiskectomy done by [facility]     COLONOSCOPY  11/18/2011    Procedure:COLONOSCOPY; COLONOSCOPY; Surgeon:JESE FRANKLIN; Location: GI    COLONOSCOPY Left 5/5/2021    Procedure: COLONOSCOPY;  Surgeon: Alfredo Munoz MD;  Location:  GI    DISCECTOMY LUMBAR MINIMALLY INVASIVE ONE LEVEL  2011    right L4-5 microdiscectomy    EXCISE MASS LOWER EXTREMITY  2/12/2014    Procedure: EXCISE MASS LOWER EXTREMITY;  Right Knee/tibial tubercle Mass Excision ;  Surgeon: Clifford Aguilera MD;  Location: [facility] OR    LAPAROSCOPIC APPENDECTOMY N/A 2/2/2023    Procedure: LAPAROSCOPIC APPENDECTOMY;  Surgeon: Renetta Dillon MD;  Location: [facility] OR       CURRENT MEDICATIONS:    Prior to Admission Medications   Prescriptions Last Dose Informant Patient Reported? Taking?   L-Carnosine POWD   Yes No   Multiple Vitamins-Minerals (ZINC PO)   Yes No   clomiPHENE (CLOMID) 50 MG tablet   Yes No   Sig: Take 25 mg by mouth daily   vitamin D3 (CHOLECALCIFEROL) 50 mcg (2000 units) tablet   Yes No   Sig: Take 1 tablet (50 mcg) by mouth daily      Facility-Administered Medications: None       ALLERGIES:  Allergies   Allergen Reactions    Aspartame Hives and Swelling    Ibuprofen [Aspartame-Ibuprofen] Hives and Swelling    Pcn [Penicillins] Hives and Swelling     No problems with Cephalexin    Aspirin      Eyes swelled    Gadolinium Hives       FAMILY HISTORY:  Family History   Adopted: Yes   Problem Relation Age of Onset    Unknown/Adopted Mother     Unknown/Adopted Father     Colon Cancer No family hx of        SOCIAL  HISTORY:  Social History     Tobacco Use    Smoking status: Former     Current packs/day: 0.00     Average packs/day: 1 pack/day for 25.0 years (25.0 ttl pk-yrs)     Types: Cigarettes     Start date: 10/9/1985     Quit date: 10/9/2010     Years since quittin.9    Smokeless tobacco: Never   Vaping Use    Vaping status: Never Used   Substance Use Topics    Alcohol use: Yes     Comment: 3x/week, 6 drinks    Drug use: No        VITALS:    First Vitals:  No data found.      No data found.        PHYSICAL EXAM  VITAL SIGNS: /86   Pulse 83   Temp 99.7  F (37.6  C) (Temporal)   Resp 16   Ht 1.829 m (6')   Wt 79.4 kg (175 lb)   SpO2 97%   BMI 23.73 kg/m     GENERAL: Awake, alert, answering questions appropriately, in no acute distress.  HEENT: Normocephalic, atraumatic.   Moist mucous membranes.  Posterior oropharynx clear with no erythema, no exudate.  No tonsillar hypertrophy.  Uvula midline. Temporary crown present tooth #31, no palpable fluctuance.  Floor of the mouth is soft.  Left-sided lymphadenopathy.  Tolerating secretions, no drooling.  No trismus.    SPEECH:  Easy to understand speech, Normal volume and sasha. Normal phonation.  PULMONARY: No respiratory distress, Breathing comfortably on room air. Lungs clear to auscultation bilaterally.  CARDIOVASCULAR: Regular rate and rhythm, radial pulses present, symmetric, and normal.  ABDOMINAL: Soft, Nondistended, Nontender, No rebound or guarding. Bowel sounds present  EXTREMITIES: Extremities are warm and well perfused. No lower extremity edema.  NEUROLOGIC: GCS 15. Alert, oriented. CN III-XII grossly intact. Moving all extremities spontaneously.   SKIN: Exposed areas of skin warm, dry, no rashes.  PSYCH: Normal mood and affect.         RADIOLOGY/LAB:  Reviewed all pertinent imaging. Please see official radiology report.  All pertinent labs reviewed and interpreted.         EKG:  None      PROCEDURES:  None      Medical Decision Making  Obtained  supplemental history:Supplemental history obtained?: No  Reviewed external records: External records reviewed?: No  Care impacted by chronic illness:Heart Disease  Care significantly affected by social determinants of health:Access to Medical Care  Did you consider but not order tests?: Work up considered but not performed and documented in chart, if applicable  Did you interpret images independently?: Independent interpretation of ECG and images noted in documentation, when applicable.  Consultation discussion with other provider:Did you involve another provider (consultant, , pharmacy, etc.)?: No  Discharge. I prescribed additional prescription strength medication(s) as charted. I considered admission, but ultimately discharged patient as patient has no trismus, floor of mouth is soft, patient able to tolerate his secretions, no drooling.    Dental Pain: Dental Pain: Patient was seen for dental pain.Opiates given?: No opiates were prescribed as recommended by ACEP to reduce patient harm related to the opioid crisis.    CRITICAL CARE:  Not applicable      I, Christy Girard, am serving as a scribe to document services personally performed by Alexia Rivera PA-C, based on my observation and the provider's statements to me. I, Alexia Rivera PA-C attest that Christy Girard is acting in a scribe capacity, has observed my performance of the services and has documented them in accordance with my direction.         Alexia Rivera PA-C  Emergency Medicine  [facility] EMERGENCY ROOM  [address] [address]  [phone]  Dept: [phone]     Alexia Rivera PA-C  09/16/24 0204

## 2024-09-07 NOTE — ED TRIAGE NOTES
Patient got dental crown on left lower side done on Tuesday. Reports increased swelling, pain and low grade temp at home.      Triage Assessment (Adult)       Row Name 09/06/24 2018          Triage Assessment    Airway WDL WDL        Respiratory WDL    Respiratory WDL WDL        Skin Circulation/Temperature WDL    Skin Circulation/Temperature WDL WDL        Cardiac WDL    Cardiac WDL WDL        Peripheral/Neurovascular WDL    Peripheral Neurovascular WDL WDL        Cognitive/Neuro/Behavioral WDL    Cognitive/Neuro/Behavioral WDL WDL

## 2024-09-07 NOTE — ED PROVIDER NOTES
ED CONSULTATION  Date/Time:9/6/2024 9:26 PM    I am seeing this patient along with Alexia Rivera PA-C.  IRonaldo have reviewed the documentation, personally taken the patient's history, performed an exam and agree with the physical finds, diagnosis and management plan.    HPI:Ladarius Gaytan is a 64 year old male with no pertinent medical history who presents to the emergency department for evaluation of dental pain.      The patient reports dental pain and left-sided jaw swelling. He had a temporary dental crown placed on 9/3. Patient started developing pain this morning, the pain worsened as the day passed. He was able to eat and drink without difficulty. Patient took his temperature which measured to be 99.1.      No blood thinners. He has not taken any medication for pain relief.      Denies vomiting.     I, Christy Girard, am serving as a scribe to document services personally performed by Ronaldo Gonzalez MD, based on my observation and the provider's statements to me. I, Ronaldo Gonzalez MD attest that Christy Girard is acting in a scribe capacity, has observed my performance of the services and has documented them in accordance with my direction.      Physical Exam:/82   Pulse 87   Temp 99.7  F (37.6  C) (Temporal)   Resp 16   Ht 1.829 m (6')   Wt 79.4 kg (175 lb)   SpO2 97%   BMI 23.73 kg/m    Constitutional:  Well developed, Well nourished  HENT:  Normocephalic, Atraumatic, Bilateral external ears normal, tender at tooth #31.  There is a crown there.  Minimal erythema at the base of the tooth.  Soft under the tongue.  No trismus or difficulty swallowing.  Left-sided lymphadenopathy.  Oropharynx moist, No oral exudates, Nose normal. Neck- Normal range of motion   Eyes: Conjunctiva normal, No discharge.   Respiratory:  Normal breath sounds, No respiratory distress, No wheezing  Cardiovascular:  Normal heart rate, Normal rhythm.   GI:  Soft, No  tenderness, No masses, No flank tenderness.   Musculoskeletal: Full ROM  Integument:  Warm, Dry, No erythema, No rash.    Neurologic:  Alert & oriented.  No focal deficits appreciated  Psychiatric:  Affect normal, Judgment normal, Mood normal.     MDM:64 year old who presents with dental pain.  Had a crown placed earlier this week.  Initially doing well but now having worsening pain.  Having fevers at home.  Likely low-grade infection.  No signs of jaw fracture.  No signs of deeper infection.  Will place on clindamycin.  Has an appointment with dentist tomorrow.  Will return for any worsening symptoms         1. Dental infection           No results found.     Current Discharge Medication List        START taking these medications    Details   clindamycin (CLEOCIN) 300 MG capsule Take 1 capsule (300 mg) by mouth 4 times daily for 10 days.  Qty: 40 capsule, Refills: 0             Final disposition will be per the depending diagnostic studies and patient's clinical trajectory.    This is an accurate record of my words and actions during this visit as documented by the scribe.          Ronaldo Gonzalez MD  09/06/24 5491

## 2024-09-07 NOTE — DISCHARGE INSTRUCTIONS
You were seen in the Emergency Department for dental pain. Please bring this paper work to your follow up appointment for the next provider to review and continue providing your care.  We believe that you may have a dental infection. For your infection, please take the antibiotic clindamycin as prescribed. If your pain is not improved in 2 days with this therapy, please see a dentist.    Take Tylenol for pain. Do not take more than:  Tylenol 1000 mg every 6 hours (Max of 4000 mg per day)  Please take your medicines as recommended above and review the discharge instructions for concerning signs/symptoms that would require your prompt return to the emergency department for further evaluation. Please follow up in clinic as we have recommended below. If your symptoms worsen prior to your follow up appointment, do not hesitate to return here to the emergency department for further evaluation. We'd be happy to see you again.

## 2024-09-09 ENCOUNTER — PATIENT OUTREACH (OUTPATIENT)
Dept: PEDIATRICS | Facility: CLINIC | Age: 64
End: 2024-09-09
Payer: COMMERCIAL

## 2024-09-09 NOTE — TELEPHONE ENCOUNTER
"Call patient for hospital follow up.  Most Recent Admission Date: 9/6/2024   Most Recent Admission Diagnosis:      Most Recent Discharge Date: 9/6/2024   Most Recent Discharge Diagnosis: Dental infection - K04.7     \"We believe that you may have a dental infection. For your infection, please take the antibiotic clindamycin as prescribed. If your pain is not improved in 2 days with this therapy, please see a dentist.  Take Tylenol for pain. Do not take more than:  Tylenol 1000 mg every 6 hours (Max of 4000 mg per day)\"    Med changes: \"Medication Changes  Clindamycin HCl 300 mg Oral 4 TIMES DAILY\"    After Visit Summary follow up recommendations:   \"Follow-Ups  Schedule an appointment with Stevie Hood MD (Internal Medicine - Pediatrics)  Go to Emergency department; As needed, if you develop new, worsening, concerning symptoms  Go to Your dentist; your appointment tomorrow as scheduled\"    Primary care appointment needed within 30days    Primary care hospital follow up appointment has not been made.    Eddie Hopkins RN on 9/9/2024 at 9:55 AM        "

## 2024-09-10 NOTE — TELEPHONE ENCOUNTER
ED / Discharge Outreach Protocol    Patient Contact    Attempt # 2    Was call answered?  No.  Left message on voicemail with information to call back and speak with any triage nurse.     Second outreach attempt, closing encounter per TCM outreach protocol.     Anselmo DALY RN 9/10/2024 at 3:04 PM

## 2024-09-10 NOTE — TELEPHONE ENCOUNTER
Patient reports antibiotics seem to be working. Patient had root canal done this mornign at [facility]     Patient declines visit at this time. Patient saw dental provider and had root canal performed this morning. Patient declines scheduling with PCP at this time. Reports he will finish antibiotic and he will return to ED if pain returns. RN advised patient call to speak to a triage nurse to discuss symptoms and triage can provide recommendation on wherep atient should be seen based on symptoms. Patient verbalized understanding.     Shefali VILLEGAS RN, BSN  [facility]

## 2024-10-08 ENCOUNTER — PATIENT OUTREACH (OUTPATIENT)
Dept: CARE COORDINATION | Facility: CLINIC | Age: 64
End: 2024-10-08
Payer: COMMERCIAL

## 2024-12-03 ENCOUNTER — OFFICE VISIT (OUTPATIENT)
Dept: PEDIATRICS | Facility: CLINIC | Age: 64
End: 2024-12-03
Payer: COMMERCIAL

## 2024-12-03 VITALS
HEIGHT: 71 IN | RESPIRATION RATE: 20 BRPM | SYSTOLIC BLOOD PRESSURE: 128 MMHG | OXYGEN SATURATION: 98 % | HEART RATE: 69 BPM | BODY MASS INDEX: 25.67 KG/M2 | DIASTOLIC BLOOD PRESSURE: 83 MMHG | WEIGHT: 183.38 LBS | TEMPERATURE: 97.9 F

## 2024-12-03 DIAGNOSIS — M54.16 LUMBAR RADICULOPATHY: ICD-10-CM

## 2024-12-03 DIAGNOSIS — N52.9 ERECTILE DYSFUNCTION, UNSPECIFIED ERECTILE DYSFUNCTION TYPE: ICD-10-CM

## 2024-12-03 DIAGNOSIS — Z00.00 ROUTINE GENERAL MEDICAL EXAMINATION AT A HEALTH CARE FACILITY: Primary | ICD-10-CM

## 2024-12-03 DIAGNOSIS — E29.1 HYPOGONADISM MALE: ICD-10-CM

## 2024-12-03 DIAGNOSIS — Z12.5 SCREENING FOR PROSTATE CANCER: ICD-10-CM

## 2024-12-03 DIAGNOSIS — L72.0 EPIDERMOID CYST: ICD-10-CM

## 2024-12-03 LAB
ALBUMIN SERPL BCG-MCNC: 4.3 G/DL (ref 3.5–5.2)
ALP SERPL-CCNC: 48 U/L (ref 40–150)
ALT SERPL W P-5'-P-CCNC: 22 U/L (ref 0–70)
ANION GAP SERPL CALCULATED.3IONS-SCNC: 10 MMOL/L (ref 7–15)
AST SERPL W P-5'-P-CCNC: 24 U/L (ref 0–45)
BASOPHILS # BLD AUTO: 0.1 10E3/UL (ref 0–0.2)
BASOPHILS NFR BLD AUTO: 1 %
BILIRUB SERPL-MCNC: 0.3 MG/DL
BUN SERPL-MCNC: 13.3 MG/DL (ref 8–23)
CALCIUM SERPL-MCNC: 9.3 MG/DL (ref 8.8–10.4)
CHLORIDE SERPL-SCNC: 101 MMOL/L (ref 98–107)
CHOLEST SERPL-MCNC: 202 MG/DL
CREAT SERPL-MCNC: 1.04 MG/DL (ref 0.67–1.17)
EGFRCR SERPLBLD CKD-EPI 2021: 80 ML/MIN/1.73M2
EOSINOPHIL # BLD AUTO: 0.4 10E3/UL (ref 0–0.7)
EOSINOPHIL NFR BLD AUTO: 7 %
ERYTHROCYTE [DISTWIDTH] IN BLOOD BY AUTOMATED COUNT: 12 % (ref 10–15)
EST. AVERAGE GLUCOSE BLD GHB EST-MCNC: 108 MG/DL
FASTING STATUS PATIENT QL REPORTED: YES
FASTING STATUS PATIENT QL REPORTED: YES
GLUCOSE SERPL-MCNC: 88 MG/DL (ref 70–99)
HBA1C MFR BLD: 5.4 % (ref 0–5.6)
HCO3 SERPL-SCNC: 27 MMOL/L (ref 22–29)
HCT VFR BLD AUTO: 46.2 % (ref 40–53)
HDLC SERPL-MCNC: 54 MG/DL
HGB BLD-MCNC: 15.4 G/DL (ref 13.3–17.7)
IMM GRANULOCYTES # BLD: 0 10E3/UL
IMM GRANULOCYTES NFR BLD: 0 %
LDLC SERPL CALC-MCNC: 128 MG/DL
LYMPHOCYTES # BLD AUTO: 1.9 10E3/UL (ref 0.8–5.3)
LYMPHOCYTES NFR BLD AUTO: 30 %
MCH RBC QN AUTO: 32.1 PG (ref 26.5–33)
MCHC RBC AUTO-ENTMCNC: 33.3 G/DL (ref 31.5–36.5)
MCV RBC AUTO: 96 FL (ref 78–100)
MONOCYTES # BLD AUTO: 0.7 10E3/UL (ref 0–1.3)
MONOCYTES NFR BLD AUTO: 11 %
NEUTROPHILS # BLD AUTO: 3.3 10E3/UL (ref 1.6–8.3)
NEUTROPHILS NFR BLD AUTO: 52 %
NONHDLC SERPL-MCNC: 148 MG/DL
PLATELET # BLD AUTO: 179 10E3/UL (ref 150–450)
POTASSIUM SERPL-SCNC: 4.4 MMOL/L (ref 3.4–5.3)
PROT SERPL-MCNC: 6.5 G/DL (ref 6.4–8.3)
PSA SERPL DL<=0.01 NG/ML-MCNC: 0.81 NG/ML (ref 0–4.5)
RBC # BLD AUTO: 4.8 10E6/UL (ref 4.4–5.9)
SODIUM SERPL-SCNC: 138 MMOL/L (ref 135–145)
TRIGL SERPL-MCNC: 101 MG/DL
WBC # BLD AUTO: 6.4 10E3/UL (ref 4–11)

## 2024-12-03 PROCEDURE — 80061 LIPID PANEL: CPT | Performed by: INTERNAL MEDICINE

## 2024-12-03 PROCEDURE — 83036 HEMOGLOBIN GLYCOSYLATED A1C: CPT | Performed by: INTERNAL MEDICINE

## 2024-12-03 PROCEDURE — 80053 COMPREHEN METABOLIC PANEL: CPT | Performed by: INTERNAL MEDICINE

## 2024-12-03 PROCEDURE — 36415 COLL VENOUS BLD VENIPUNCTURE: CPT | Performed by: INTERNAL MEDICINE

## 2024-12-03 PROCEDURE — 85025 COMPLETE CBC W/AUTO DIFF WBC: CPT | Performed by: INTERNAL MEDICINE

## 2024-12-03 PROCEDURE — 90471 IMMUNIZATION ADMIN: CPT | Performed by: INTERNAL MEDICINE

## 2024-12-03 PROCEDURE — 99396 PREV VISIT EST AGE 40-64: CPT | Mod: 25 | Performed by: INTERNAL MEDICINE

## 2024-12-03 PROCEDURE — G0103 PSA SCREENING: HCPCS | Performed by: INTERNAL MEDICINE

## 2024-12-03 PROCEDURE — 90673 RIV3 VACCINE NO PRESERV IM: CPT | Performed by: INTERNAL MEDICINE

## 2024-12-03 RX ORDER — OMEGA-3S/DHA/EPA/FISH OIL/D3 300MG-1000
CAPSULE ORAL
COMMUNITY

## 2024-12-03 RX ORDER — CALCIUM CARBONATE 260MG(650)
TABLET,CHEWABLE ORAL
COMMUNITY

## 2024-12-03 RX ORDER — ACETAMINOPHEN 500 MG
TABLET ORAL
COMMUNITY

## 2024-12-03 RX ORDER — CHLORHEXIDINE GLUCONATE ORAL RINSE 1.2 MG/ML
SOLUTION DENTAL
COMMUNITY
End: 2024-12-03

## 2024-12-03 RX ORDER — AZITHROMYCIN 250 MG/1
TABLET, FILM COATED ORAL
COMMUNITY
End: 2024-12-03

## 2024-12-03 SDOH — HEALTH STABILITY: PHYSICAL HEALTH: ON AVERAGE, HOW MANY DAYS PER WEEK DO YOU ENGAGE IN MODERATE TO STRENUOUS EXERCISE (LIKE A BRISK WALK)?: 3 DAYS

## 2024-12-03 SDOH — HEALTH STABILITY: PHYSICAL HEALTH: ON AVERAGE, HOW MANY MINUTES DO YOU ENGAGE IN EXERCISE AT THIS LEVEL?: 40 MIN

## 2024-12-03 ASSESSMENT — SOCIAL DETERMINANTS OF HEALTH (SDOH): HOW OFTEN DO YOU GET TOGETHER WITH FRIENDS OR RELATIVES?: PATIENT DECLINED

## 2024-12-03 ASSESSMENT — PAIN SCALES - GENERAL: PAINLEVEL_OUTOF10: NO PAIN (0)

## 2024-12-03 NOTE — PATIENT INSTRUCTIONS
Colonoscopy in 2031.    Prostate specific antigen (PSA) today.      Patient Education   Preventive Care Advice   This is general advice given by our system to help you stay healthy. However, your care team may have specific advice just for you. Please talk to your care team about your preventive care needs.  Nutrition  Eat 5 or more servings of fruits and vegetables each day.  Try wheat bread, brown rice and whole grain pasta (instead of white bread, rice, and pasta).  Get enough calcium and vitamin D. Check the label on foods and aim for 100% of the RDA (recommended daily allowance).  Lifestyle  Exercise at least 150 minutes each week  (30 minutes a day, 5 days a week).  Do muscle strengthening activities 2 days a week. These help control your weight and prevent disease.  No smoking.  Wear sunscreen to prevent skin cancer.  Have a dental exam and cleaning every 6 months.  Yearly exams  See your health care team every year to talk about:  Any changes in your health.  Any medicines your care team has prescribed.  Preventive care, family planning, and ways to prevent chronic diseases.  Shots (vaccines)   HPV shots (up to age 26), if you've never had them before.  Hepatitis B shots (up to age 59), if you've never had them before.  COVID-19 shot: Get this shot when it's due.  Flu shot: Get a flu shot every year.  Tetanus shot: Get a tetanus shot every 10 years.  Pneumococcal, hepatitis A, and RSV shots: Ask your care team if you need these based on your risk.  Shingles shot (for age 50 and up)  General health tests  Diabetes screening:  Starting at age 35, Get screened for diabetes at least every 3 years.  If you are younger than age 35, ask your care team if you should be screened for diabetes.  Cholesterol test: At age 39, start having a cholesterol test every 5 years, or more often if advised.  Bone density scan (DEXA): At age 50, ask your care team if you should have this scan for osteoporosis (brittle  bones).  Hepatitis C: Get tested at least once in your life.  STIs (sexually transmitted infections)  Before age 24: Ask your care team if you should be screened for STIs.  After age 24: Get screened for STIs if you're at risk. You are at risk for STIs (including HIV) if:  You are sexually active with more than one person.  You don't use condoms every time.  You or a partner was diagnosed with a sexually transmitted infection.  If you are at risk for HIV, ask about PrEP medicine to prevent HIV.  Get tested for HIV at least once in your life, whether you are at risk for HIV or not.  Cancer screening tests  Cervical cancer screening: If you have a cervix, begin getting regular cervical cancer screening tests starting at age 21.  Breast cancer scan (mammogram): If you've ever had breasts, begin having regular mammograms starting at age 40. This is a scan to check for breast cancer.  Colon cancer screening: It is important to start screening for colon cancer at age 45.  Have a colonoscopy test every 10 years (or more often if you're at risk) Or, ask your provider about stool tests like a FIT test every year or Cologuard test every 3 years.  To learn more about your testing options, visit:   .  For help making a decision, visit:   https://bit.ly/ft45038.  Prostate cancer screening test: If you have a prostate, ask your care team if a prostate cancer screening test (PSA) at age 55 is right for you.  Lung cancer screening: If you are a current or former smoker ages 50 to 80, ask your care team if ongoing lung cancer screenings are right for you.  For informational purposes only. Not to replace the advice of your health care provider. Copyright   2023 [facility]. All rights reserved. Clinically reviewed by the [facility] Transitions Program. [employer] 651049 - REV 01/24.

## 2024-12-03 NOTE — PROGRESS NOTES
"Preventive Care Visit  [facility] ELIANA Hood MD, Internal Medicine - Pediatrics  Dec 3, 2024      Assessment & Plan     Hypogonadism male  Clomiphene per urology.     Lumbar radiculopathy  Improved symptoms.     Erectile dysfunction, unspecified erectile dysfunction type  On clomiphene.  Did not respond well to cialis.     Routine general medical examination at a health care facility  No concerns.  Doing well.   - CBC with platelets and differential; Future  - Comprehensive metabolic panel (BMP + Alb, Alk Phos, ALT, AST, Total. Bili, TP); Future  - Hemoglobin A1c; Future  - Lipid panel reflex to direct LDL Fasting; Future    Screening for prostate cancer  On clomiphene.  Desires ongoing prostate specific antigen (PSA) screening.   - PSA, screen; Future    Cervicalgia:  no red flags.    Home physical therapy exercises given.     Patient has been advised of split billing requirements and indicates understanding: Yes        BMI  Estimated body mass index is 25.39 kg/m  as calculated from the following:    Height as of this encounter: 1.81 m (5' 11.26\").    Weight as of this encounter: 83.2 kg (183 lb 6 oz).   Weight management plan: Discussed healthy diet and exercise guidelines    Counseling  Appropriate preventive services were addressed with this patient via screening, questionnaire, or discussion as appropriate for fall prevention, nutrition, physical activity, Tobacco-use cessation, social engagement, weight loss and cognition.  Checklist reviewing preventive services available has been given to the patient.  Reviewed patient's diet, addressing concerns and/or questions.   He is at risk for lack of exercise and has been provided with information to increase physical activity for the benefit of his well-being.       See Patient Instructions    Joaquín Durand is a 64 year old, presenting for the following:  Physical        12/3/2024     9:10 AM   Additional Questions   Roomed by Marge" MELODY Gray   Accompanied by N/A         12/3/2024     9:10 AM   Patient Reported Additional Medications   Patient reports taking the following new medications N/A          HPI  Left neck symptoms. Some left arm weakenss posterior arm.      When does exercises, notes trapezius pain bilaterally.     Back symptoms: had sudden leg weakness that improved with time; MRI done and was given exercises for trunk extension.           Health Care Directive      12/3/2024   General Health   How would you rate your overall physical health? Good   Feel stress (tense, anxious, or unable to sleep) Not at all            12/3/2024   Nutrition   Three or more servings of calcium each day? Yes   Diet: Regular (no restrictions)   How many servings of fruit and vegetables per day? (!) 2-3   How many sweetened beverages each day? 0-1            12/3/2024   Exercise   Days per week of moderate/strenous exercise 3 days   Average minutes spent exercising at this level 40 min            12/3/2024   Social Factors   Frequency of gathering with friends or relatives Patient declined   Worry food won't last until get money to buy more No   Food not last or not have enough money for food? No   Do you have housing? (Housing is defined as stable permanent housing and does not include staying ouside in a car, in a tent, in an abandoned building, in an overnight shelter, or couch-surfing.) Yes   Are you worried about losing your housing? No   Lack of transportation? No   Unable to get utilities (heat,electricity)? No            12/3/2024   Fall Risk   Fallen 2 or more times in the past year? No    Trouble with walking or balance? No        Patient-reported          12/3/2024   Dental   Dentist two times every year? Yes            12/3/2024   TB Screening   Were you born outside of the [place]? No            Today's PHQ-2 Score:       12/3/2024     9:05 AM   PHQ-2 ( 1999 [employer])   Q1: Little interest or pleasure in doing things 0    Q2: Feeling down,  depressed or hopeless 0    PHQ-2 Score 0    Q1: Little interest or pleasure in doing things Not at all   Q2: Feeling down, depressed or hopeless Not at all   PHQ-2 Score 0       Patient-reported           12/3/2024   Substance Use   Alcohol more than 3/day or more than 7/wk Not Applicable   Do you use any other substances recreationally? No        Social History     Tobacco Use    Smoking status: Former     Current packs/day: 0.00     Average packs/day: 1 pack/day for 25.0 years (25.0 ttl pk-yrs)     Types: Cigarettes     Start date: 10/9/1985     Quit date: 10/9/2010     Years since quittin.1    Smokeless tobacco: Never   Vaping Use    Vaping status: Never Used   Substance Use Topics    Alcohol use: Yes     Comment: 3x/week, 6 drinks    Drug use: No           12/3/2024   STI Screening   New sexual partner(s) since last STI/HIV test? No      Last PSA:   Prostate Specific Antigen Screen   Date Value Ref Range Status   2023 0.68 0.00 - 4.50 ng/mL Final   10/10/2022 0.89 0.00 - 4.00 ug/L Final     ASCVD Risk   The 10-year ASCVD risk score (Jacklyn CAMPBELL, et al., 2019) is: 10.6%    Values used to calculate the score:      Age: 64 years      Sex: Male      Is Non- : No      Diabetic: No      Tobacco smoker: No      Systolic Blood Pressure: 132 mmHg      Is BP treated: No      HDL Cholesterol: 59 mg/dL      Total Cholesterol: 185 mg/dL           Reviewed and updated as needed this visit by Provider               PHQ-2 Score:         12/3/2024     9:05 AM 2023    10:20 AM   PHQ-2 (  [employer])   Q1: Little interest or pleasure in doing things 0  1    Q2: Feeling down, depressed or hopeless 0  0    PHQ-2 Score 0  1   Q1: Little interest or pleasure in doing things Not at all Several days   Q2: Feeling down, depressed or hopeless Not at all Not at all   PHQ-2 Score 0 1       Patient-reported         Past Medical History:   Diagnosis Date    Closed C7 fracture without spinal cord  injury, initial encounter (H) 08/27/2020    Disorder of ligament 08/28/2020     Past Surgical History:   Procedure Laterality Date    BACK SURGERY      micrdiskectomy done by [facility]     COLONOSCOPY  11/18/2011    Procedure:COLONOSCOPY; COLONOSCOPY; Surgeon:JESE FRANKLIN; Location: GI    COLONOSCOPY Left 5/5/2021    Procedure: COLONOSCOPY;  Surgeon: Alfredo Munoz MD;  Location:  GI    DISCECTOMY LUMBAR MINIMALLY INVASIVE ONE LEVEL  2011    right L4-5 microdiscectomy    EXCISE MASS LOWER EXTREMITY  2/12/2014    Procedure: EXCISE MASS LOWER EXTREMITY;  Right Knee/tibial tubercle Mass Excision ;  Surgeon: Clifford Aguilera MD;  Location:  OR    LAPAROSCOPIC APPENDECTOMY N/A 2/2/2023    Procedure: LAPAROSCOPIC APPENDECTOMY;  Surgeon: Renetta Dillon MD;  Location:  OR         Review of Systems  Constitutional, HEENT, cardiovascular, pulmonary, GI, , musculoskeletal, neuro, skin, endocrine and psych systems are negative, except as otherwise noted.     Objective    Exam  There were no vitals taken for this visit.   Estimated body mass index is 23.73 kg/m  as calculated from the following:    Height as of 9/6/24: 1.829 m (6').    Weight as of 9/6/24: 79.4 kg (175 lb).    Physical Exam  GENERAL: alert and no distress  EYES: Eyes grossly normal to inspection, PERRL and conjunctivae and sclerae normal  HENT: ear canals and TM's normal, nose and mouth without ulcers or lesions  NECK: no adenopathy, no asymmetry, masses, or scars  RESP: lungs clear to auscultation - no rales, rhonchi or wheezes  CV: regular rate and rhythm, normal S1 S2, no S3 or S4, no murmur, click or rub, no peripheral edema  ABDOMEN: soft, nontender, no hepatosplenomegaly, no masses and bowel sounds normal  MS: no gross musculoskeletal defects noted, no edema  SKIN: no suspicious lesions or rashes  NEURO: Normal strength and tone, mentation intact and speech normal  PSYCH: mentation appears normal, affect  normal/bright        Signed Electronically by: Stevie Hood MD

## 2025-08-05 ENCOUNTER — OFFICE VISIT (OUTPATIENT)
Dept: DERMATOLOGY | Facility: CLINIC | Age: 65
End: 2025-08-05
Attending: INTERNAL MEDICINE
Payer: COMMERCIAL

## 2025-08-05 DIAGNOSIS — L72.0 EIC (EPIDERMAL INCLUSION CYST): Primary | ICD-10-CM

## 2025-08-05 DIAGNOSIS — L73.8 SENILE SEBACEOUS GLAND HYPERPLASIA: ICD-10-CM

## 2025-08-05 DIAGNOSIS — L72.0 EPIDERMOID CYST: ICD-10-CM

## 2025-08-05 PROCEDURE — 99203 OFFICE O/P NEW LOW 30 MIN: CPT | Performed by: DERMATOLOGY

## 2025-08-22 ENCOUNTER — TRANSFERRED RECORDS (OUTPATIENT)
Dept: HEALTH INFORMATION MANAGEMENT | Facility: CLINIC | Age: 65
End: 2025-08-22
Payer: COMMERCIAL

## (undated) DEVICE — SOL NACL 0.9% IRRIG 1000ML BOTTLE 2F7124

## (undated) DEVICE — KIT ENDO TURNOVER/PROCEDURE W/CLEAN A SCOPE LINERS 103888

## (undated) DEVICE — GLOVE BIOGEL PI MICRO SZ 6.5 48565

## (undated) DEVICE — SOL NACL 0.9% INJ 1000ML BAG 2B1324X

## (undated) DEVICE — GOWN IMPERVIOUS ZONED XLG 9041

## (undated) DEVICE — ENDO POUCH UNIV RETRIEVAL SYSTEM INZII 10MM CD001

## (undated) DEVICE — GLOVE BIOGEL PI MICRO INDICATOR UNDERGLOVE SZ 6.5 48965

## (undated) DEVICE — ESU PENCIL W/HOLSTER E2350H

## (undated) DEVICE — ESU GROUND PAD ADULT W/CORD E7507

## (undated) DEVICE — ENDO TROCAR BLUNT TIP KII BALLOON 12X100MM C0R47

## (undated) DEVICE — LINEN HALF SHEET 5512

## (undated) DEVICE — STPL ENDO RELOAD ECHELON 45X2.0MM GREY ECR45M

## (undated) DEVICE — SU VICRYL 0 UR-6 27" J603H

## (undated) DEVICE — ENDO TROCAR SLEEVE KII Z-THREADED 05X100MM CTS02

## (undated) DEVICE — BAG CLEAR TRASH 1.3M 39X33" P4040C

## (undated) DEVICE — SUCTION IRR STRYKERFLOW II W/TIP 250-070-520

## (undated) DEVICE — STPL POWERED ECHELON 45MM PSEE45A

## (undated) DEVICE — LINEN FULL SHEET 5511

## (undated) DEVICE — Device

## (undated) DEVICE — BLADE CLIPPER 3M 9670

## (undated) DEVICE — ENDO TROCAR FIRST ENTRY KII FIOS Z-THRD 05X100MM CTF03

## (undated) DEVICE — LINEN POUCH DBL 5427

## (undated) DEVICE — SUCTION CANISTER MEDIVAC LINER 3000ML W/LID 65651-530

## (undated) DEVICE — NDL 22GA 1.5"

## (undated) DEVICE — LINEN TOWEL PACK X10 5473

## (undated) DEVICE — SU VICRYL 4-0 PS-2 18" UND J496H

## (undated) DEVICE — ESU CORD MONOPOLAR 10'  E0510

## (undated) DEVICE — ESU ELEC BLADE 2.75" COATED/INSULATED E1455

## (undated) RX ORDER — BUPIVACAINE HYDROCHLORIDE 5 MG/ML
INJECTION, SOLUTION EPIDURAL; INTRACAUDAL
Status: DISPENSED
Start: 2023-02-02

## (undated) RX ORDER — GLYCOPYRROLATE 0.2 MG/ML
INJECTION INTRAMUSCULAR; INTRAVENOUS
Status: DISPENSED
Start: 2023-02-02

## (undated) RX ORDER — METRONIDAZOLE 500 MG/100ML
INJECTION, SOLUTION INTRAVENOUS
Status: DISPENSED
Start: 2023-02-02

## (undated) RX ORDER — LIDOCAINE HYDROCHLORIDE 10 MG/ML
INJECTION, SOLUTION EPIDURAL; INFILTRATION; INTRACAUDAL; PERINEURAL
Status: DISPENSED
Start: 2023-02-02

## (undated) RX ORDER — ONDANSETRON 2 MG/ML
INJECTION INTRAMUSCULAR; INTRAVENOUS
Status: DISPENSED
Start: 2023-02-02

## (undated) RX ORDER — FENTANYL CITRATE 50 UG/ML
INJECTION, SOLUTION INTRAMUSCULAR; INTRAVENOUS
Status: DISPENSED
Start: 2023-02-02

## (undated) RX ORDER — DEXAMETHASONE SODIUM PHOSPHATE 4 MG/ML
INJECTION, SOLUTION INTRA-ARTICULAR; INTRALESIONAL; INTRAMUSCULAR; INTRAVENOUS; SOFT TISSUE
Status: DISPENSED
Start: 2023-02-02

## (undated) RX ORDER — FENTANYL CITRATE 50 UG/ML
INJECTION, SOLUTION INTRAMUSCULAR; INTRAVENOUS
Status: DISPENSED
Start: 2021-05-05